# Patient Record
Sex: FEMALE | Race: WHITE | NOT HISPANIC OR LATINO | Employment: FULL TIME | URBAN - METROPOLITAN AREA
[De-identification: names, ages, dates, MRNs, and addresses within clinical notes are randomized per-mention and may not be internally consistent; named-entity substitution may affect disease eponyms.]

---

## 2018-02-27 ENCOUNTER — OFFICE VISIT CONVERTED (OUTPATIENT)
Dept: GASTROENTEROLOGY | Facility: CLINIC | Age: 28
End: 2018-02-27
Attending: INTERNAL MEDICINE

## 2018-04-24 ENCOUNTER — OFFICE VISIT CONVERTED (OUTPATIENT)
Dept: GASTROENTEROLOGY | Facility: CLINIC | Age: 28
End: 2018-04-24
Attending: INTERNAL MEDICINE

## 2019-02-25 ENCOUNTER — HOSPITAL ENCOUNTER (OUTPATIENT)
Dept: OTHER | Facility: HOSPITAL | Age: 29
Discharge: HOME | End: 2019-02-25

## 2019-02-25 LAB
ALBUMIN SERPL-MCNC: 4.4 G/DL (ref 3.5–5)
ALBUMIN/GLOB SERPL: 1.8 {RATIO} (ref 1.4–2.6)
ALP SERPL-CCNC: 47 U/L (ref 42–98)
ALT SERPL-CCNC: 10 U/L (ref 10–40)
ANION GAP SERPL CALC-SCNC: 14 MMOL/L (ref 8–19)
AST SERPL-CCNC: 17 U/L (ref 15–50)
BASOPHILS # BLD AUTO: 0.03 10*3/UL (ref 0–0.2)
BASOPHILS NFR BLD AUTO: 0.4 % (ref 0–3)
BILIRUB SERPL-MCNC: 0.54 MG/DL (ref 0.2–1.3)
BUN SERPL-MCNC: 11 MG/DL (ref 5–25)
BUN/CREAT SERPL: 16 {RATIO} (ref 6–20)
CALCIUM SERPL-MCNC: 9.2 MG/DL (ref 8.7–10.4)
CHLORIDE SERPL-SCNC: 105 MMOL/L (ref 99–111)
CHOLEST SERPL-MCNC: 172 MG/DL (ref 107–200)
CHOLEST/HDLC SERPL: 2.3 {RATIO} (ref 3–6)
CONV ABS IMM GRAN: 0.01 10*3/UL (ref 0–0.2)
CONV CO2: 26 MMOL/L (ref 22–32)
CONV IMMATURE GRAN: 0.1 % (ref 0–1.8)
CONV TOTAL PROTEIN: 6.9 G/DL (ref 6.3–8.2)
CREAT UR-MCNC: 0.7 MG/DL (ref 0.5–0.9)
DEPRECATED RDW RBC AUTO: 38.1 FL (ref 36.4–46.3)
EOSINOPHIL # BLD AUTO: 0.01 10*3/UL (ref 0–0.7)
EOSINOPHIL # BLD AUTO: 0.1 % (ref 0–7)
ERYTHROCYTE [DISTWIDTH] IN BLOOD BY AUTOMATED COUNT: 11.8 % (ref 11.7–14.4)
GFR SERPLBLD BASED ON 1.73 SQ M-ARVRAT: >60 ML/MIN/{1.73_M2}
GLOBULIN UR ELPH-MCNC: 2.5 G/DL (ref 2–3.5)
GLUCOSE SERPL-MCNC: 89 MG/DL (ref 65–99)
HBA1C MFR BLD: 13.8 G/DL (ref 12–16)
HCT VFR BLD AUTO: 41.3 % (ref 37–47)
HDLC SERPL-MCNC: 74 MG/DL (ref 40–60)
LDLC SERPL CALC-MCNC: 90 MG/DL (ref 70–100)
LYMPHOCYTES # BLD AUTO: 1.73 10*3/UL (ref 1–5)
MCH RBC QN AUTO: 29.9 PG (ref 27–31)
MCHC RBC AUTO-ENTMCNC: 33.4 G/DL (ref 33–37)
MCV RBC AUTO: 89.4 FL (ref 81–99)
MONOCYTES # BLD AUTO: 0.4 10*3/UL (ref 0.2–1.2)
MONOCYTES NFR BLD AUTO: 5.2 % (ref 3–10)
NEUTROPHILS # BLD AUTO: 5.53 10*3/UL (ref 2–8)
NEUTROPHILS NFR BLD AUTO: 71.8 % (ref 30–85)
NRBC CBCN: 0 % (ref 0–0.7)
OSMOLALITY SERPL CALC.SUM OF ELEC: 293 MOSM/KG (ref 273–304)
PLATELET # BLD AUTO: 201 10*3/UL (ref 130–400)
PMV BLD AUTO: 9.3 FL (ref 9.4–12.3)
POTASSIUM SERPL-SCNC: 3.4 MMOL/L (ref 3.5–5.3)
RBC # BLD AUTO: 4.62 10*6/UL (ref 4.2–5.4)
SODIUM SERPL-SCNC: 142 MMOL/L (ref 135–147)
TRIGL SERPL-MCNC: 41 MG/DL (ref 40–150)
TSH SERPL-ACNC: 1.27 M[IU]/L (ref 0.27–4.2)
VARIANT LYMPHS NFR BLD MANUAL: 22.4 % (ref 20–45)
VLDLC SERPL-MCNC: 8 MG/DL (ref 5–37)
WBC # BLD AUTO: 7.71 10*3/UL (ref 4.8–10.8)

## 2019-04-26 ENCOUNTER — HOSPITAL ENCOUNTER (OUTPATIENT)
Dept: LAB | Facility: HOSPITAL | Age: 29
Discharge: HOME | End: 2019-04-26

## 2020-02-03 ENCOUNTER — HOSPITAL ENCOUNTER (OUTPATIENT)
Dept: OTHER | Facility: HOSPITAL | Age: 30
Discharge: HOME | End: 2020-02-03

## 2020-02-03 LAB
ALBUMIN SERPL-MCNC: 4.4 G/DL (ref 3.5–5)
ALBUMIN/GLOB SERPL: 1.5 {RATIO} (ref 1.4–2.6)
ALP SERPL-CCNC: 51 U/L (ref 42–98)
ALT SERPL-CCNC: 16 U/L (ref 10–40)
ANION GAP SERPL CALC-SCNC: 16 MMOL/L (ref 8–19)
AST SERPL-CCNC: 16 U/L (ref 15–50)
BASOPHILS # BLD AUTO: 0.04 10*3/UL (ref 0–0.2)
BASOPHILS NFR BLD AUTO: 0.6 % (ref 0–3)
BILIRUB SERPL-MCNC: 0.47 MG/DL (ref 0.2–1.3)
BUN SERPL-MCNC: 17 MG/DL (ref 5–25)
BUN/CREAT SERPL: 22 {RATIO} (ref 6–20)
CALCIUM SERPL-MCNC: 9.7 MG/DL (ref 8.7–10.4)
CHLORIDE SERPL-SCNC: 103 MMOL/L (ref 99–111)
CHOLEST SERPL-MCNC: 159 MG/DL (ref 107–200)
CHOLEST/HDLC SERPL: 2.4 {RATIO} (ref 3–6)
CONV ABS IMM GRAN: 0.01 10*3/UL (ref 0–0.2)
CONV CO2: 22 MMOL/L (ref 22–32)
CONV IMMATURE GRAN: 0.1 % (ref 0–1.8)
CONV TOTAL PROTEIN: 7.3 G/DL (ref 6.3–8.2)
CREAT UR-MCNC: 0.78 MG/DL (ref 0.5–0.9)
DEPRECATED RDW RBC AUTO: 39.3 FL (ref 36.4–46.3)
EOSINOPHIL # BLD AUTO: 0.12 10*3/UL (ref 0–0.7)
EOSINOPHIL # BLD AUTO: 1.7 % (ref 0–7)
ERYTHROCYTE [DISTWIDTH] IN BLOOD BY AUTOMATED COUNT: 11.9 % (ref 11.7–14.4)
GFR SERPLBLD BASED ON 1.73 SQ M-ARVRAT: >60 ML/MIN/{1.73_M2}
GLOBULIN UR ELPH-MCNC: 2.9 G/DL (ref 2–3.5)
GLUCOSE SERPL-MCNC: 96 MG/DL (ref 65–99)
HCT VFR BLD AUTO: 42 % (ref 37–47)
HDLC SERPL-MCNC: 67 MG/DL (ref 40–60)
HGB BLD-MCNC: 13.7 G/DL (ref 12–16)
LDLC SERPL CALC-MCNC: 82 MG/DL (ref 70–100)
LYMPHOCYTES # BLD AUTO: 1.69 10*3/UL (ref 1–5)
LYMPHOCYTES NFR BLD AUTO: 24.6 % (ref 20–45)
MCH RBC QN AUTO: 29.5 PG (ref 27–31)
MCHC RBC AUTO-ENTMCNC: 32.6 G/DL (ref 33–37)
MCV RBC AUTO: 90.3 FL (ref 81–99)
MONOCYTES # BLD AUTO: 0.47 10*3/UL (ref 0.2–1.2)
MONOCYTES NFR BLD AUTO: 6.9 % (ref 3–10)
NEUTROPHILS # BLD AUTO: 4.53 10*3/UL (ref 2–8)
NEUTROPHILS NFR BLD AUTO: 66.1 % (ref 30–85)
NRBC CBCN: 0 % (ref 0–0.7)
OSMOLALITY SERPL CALC.SUM OF ELEC: 285 MOSM/KG (ref 273–304)
PLATELET # BLD AUTO: 245 10*3/UL (ref 130–400)
PMV BLD AUTO: 9.2 FL (ref 9.4–12.3)
POTASSIUM SERPL-SCNC: 4 MMOL/L (ref 3.5–5.3)
RBC # BLD AUTO: 4.65 10*6/UL (ref 4.2–5.4)
SODIUM SERPL-SCNC: 137 MMOL/L (ref 135–147)
TRIGL SERPL-MCNC: 49 MG/DL (ref 40–150)
TSH SERPL-ACNC: 3.05 M[IU]/L (ref 0.27–4.2)
VLDLC SERPL-MCNC: 10 MG/DL (ref 5–37)
WBC # BLD AUTO: 6.86 10*3/UL (ref 4.8–10.8)

## 2020-04-20 ENCOUNTER — HOSPITAL ENCOUNTER (OUTPATIENT)
Dept: OTHER | Facility: HOSPITAL | Age: 30
Discharge: HOME | End: 2020-04-20
Attending: INTERNAL MEDICINE

## 2020-04-20 LAB
APPEARANCE UR: ABNORMAL
BILIRUB UR QL: ABNORMAL
COLOR UR: ABNORMAL
CONV BACTERIA: NEGATIVE
CONV COLLECTION SOURCE (UA): ABNORMAL
CONV UROBILINOGEN IN URINE BY AUTOMATED TEST STRIP: 1 {EHRLICHU}/DL (ref 0.1–1)
GLUCOSE UR QL: NEGATIVE MG/DL
HGB UR QL STRIP: NEGATIVE
KETONES UR QL STRIP: ABNORMAL MG/DL
LEUKOCYTE ESTERASE UR QL STRIP: ABNORMAL
NITRITE UR QL STRIP: NEGATIVE
PH UR STRIP.AUTO: 5 [PH] (ref 5–8)
PROT UR QL: 30 MG/DL
RBC #/AREA URNS HPF: ABNORMAL /[HPF]
SP GR UR: 1.03 (ref 1–1.03)
SQUAMOUS SPT QL MICRO: ABNORMAL /[HPF]
WBC #/AREA URNS HPF: ABNORMAL /[HPF]

## 2021-05-16 VITALS
DIASTOLIC BLOOD PRESSURE: 75 MMHG | SYSTOLIC BLOOD PRESSURE: 123 MMHG | WEIGHT: 148.12 LBS | HEIGHT: 68 IN | BODY MASS INDEX: 22.45 KG/M2

## 2021-05-16 VITALS
BODY MASS INDEX: 21.82 KG/M2 | HEIGHT: 68 IN | WEIGHT: 144 LBS | SYSTOLIC BLOOD PRESSURE: 123 MMHG | DIASTOLIC BLOOD PRESSURE: 78 MMHG

## 2021-07-04 ENCOUNTER — HOSPITAL ENCOUNTER (EMERGENCY)
Facility: HOSPITAL | Age: 31
Discharge: HOME | End: 2021-07-04
Attending: EMERGENCY MEDICINE | Admitting: EMERGENCY MEDICINE

## 2021-07-04 VITALS
BODY MASS INDEX: 23.98 KG/M2 | RESPIRATION RATE: 16 BRPM | HEIGHT: 67 IN | SYSTOLIC BLOOD PRESSURE: 121 MMHG | DIASTOLIC BLOOD PRESSURE: 77 MMHG | TEMPERATURE: 97.7 F | WEIGHT: 152.78 LBS | HEART RATE: 72 BPM | OXYGEN SATURATION: 100 %

## 2021-07-04 DIAGNOSIS — S05.01XA ABRASION OF RIGHT CORNEA, INITIAL ENCOUNTER: Primary | ICD-10-CM

## 2021-07-04 PROCEDURE — 99283 EMERGENCY DEPT VISIT LOW MDM: CPT

## 2021-07-04 RX ORDER — PROPARACAINE HYDROCHLORIDE 5 MG/ML
1 SOLUTION/ DROPS OPHTHALMIC ONCE
Status: COMPLETED | OUTPATIENT
Start: 2021-07-04 | End: 2021-07-04

## 2021-07-04 RX ADMIN — PROPARACAINE HYDROCHLORIDE 1 DROP: 5 SOLUTION/ DROPS OPHTHALMIC at 07:24

## 2021-07-04 NOTE — ED PROVIDER NOTES
Subjective     History provided by:  Patient   used: No    Eye Pain  Location:  Right eye  Quality:  Burning  Severity:  Mild  Onset quality:  Gradual  Duration:  1 day  Timing:  Constant  Progression:  Worsening  Chronicity:  New  Associated symptoms: no congestion, no cough, no diarrhea, no fatigue, no fever, no headaches, no rhinorrhea and no shortness of breath    Risk factors:  Had a friend at work prescribe her ofloxacin to cover her eye for any infection      Review of Systems   Constitutional: Negative for appetite change, chills, fatigue and fever.   HENT: Negative.  Negative for congestion and rhinorrhea.    Eyes: Positive for pain. Negative for photophobia.   Respiratory: Negative.  Negative for cough and shortness of breath.    Gastrointestinal: Negative.  Negative for diarrhea.   Endocrine: Negative.    Genitourinary: Negative.    Musculoskeletal: Negative.    Skin: Negative.    Allergic/Immunologic: Negative.  Negative for immunocompromised state.   Neurological: Negative.  Negative for headaches.   Hematological: Negative.    Psychiatric/Behavioral: Negative.    All other systems reviewed and are negative.      History reviewed. No pertinent past medical history.    No Known Allergies    History reviewed. No pertinent surgical history.    History reviewed. No pertinent family history.    Social History     Socioeconomic History   • Marital status:      Spouse name: Not on file   • Number of children: Not on file   • Years of education: Not on file   • Highest education level: Not on file   Tobacco Use   • Smoking status: Never Smoker   • Smokeless tobacco: Never Used   Substance and Sexual Activity   • Alcohol use: Never   • Drug use: Never           Objective   Physical Exam  Vitals and nursing note reviewed.   Constitutional:       General: She is not in acute distress.     Appearance: Normal appearance. She is not toxic-appearing.   HENT:      Head: Normocephalic and  atraumatic.      Mouth/Throat:      Mouth: Mucous membranes are moist.   Eyes:      General: Lids are normal. Lids are everted, no foreign bodies appreciated. Vision grossly intact. Gaze aligned appropriately. Scleral icterus (right side) present.         Right eye: No foreign body.      Extraocular Movements: Extraocular movements intact.      Right eye: Normal extraocular motion and no nystagmus.      Conjunctiva/sclera:      Right eye: Right conjunctiva is injected. No chemosis, exudate or hemorrhage.     Pupils: Pupils are equal, round, and reactive to light.     Cardiovascular:      Rate and Rhythm: Normal rate and regular rhythm.      Pulses: Normal pulses.      Heart sounds: Normal heart sounds.   Pulmonary:      Effort: Pulmonary effort is normal. No respiratory distress.      Breath sounds: Normal breath sounds.   Abdominal:      General: Abdomen is flat.      Palpations: Abdomen is soft.      Tenderness: There is no abdominal tenderness.   Musculoskeletal:         General: Normal range of motion.      Cervical back: Normal range of motion and neck supple.   Skin:     General: Skin is warm and dry.   Neurological:      Mental Status: She is alert and oriented to person, place, and time. Mental status is at baseline.         Procedures           ED Course                                           MDM  Number of Diagnoses or Management Options  Risk of Complications, Morbidity, and/or Mortality  Presenting problems: minimal    Patient Progress  Patient progress: improved      Final diagnoses:   Abrasion of right cornea, initial encounter       ED Disposition  ED Disposition     ED Disposition Condition Comment    Discharge Stable           Lawson Dominguez MD  [address] [place] [address]  [phone]    Call in 1 day           Medication List      No changes were made to your prescriptions during this visit.          Luis Carlos Haywood PA  07/04/21 0879

## 2022-01-26 ENCOUNTER — OFFICE VISIT (OUTPATIENT)
Dept: OBSTETRICS AND GYNECOLOGY | Facility: CLINIC | Age: 32
End: 2022-01-26

## 2022-01-26 VITALS
HEIGHT: 69 IN | WEIGHT: 145 LBS | SYSTOLIC BLOOD PRESSURE: 122 MMHG | BODY MASS INDEX: 21.48 KG/M2 | DIASTOLIC BLOOD PRESSURE: 76 MMHG | HEART RATE: 80 BPM

## 2022-01-26 DIAGNOSIS — Z01.419 WELL WOMAN EXAM WITH ROUTINE GYNECOLOGICAL EXAM: Primary | ICD-10-CM

## 2022-01-26 PROCEDURE — 99385 PREV VISIT NEW AGE 18-39: CPT | Performed by: OBSTETRICS & GYNECOLOGY

## 2022-01-26 PROCEDURE — G0123 SCREEN CERV/VAG THIN LAYER: HCPCS | Performed by: OBSTETRICS & GYNECOLOGY

## 2022-01-26 NOTE — PROGRESS NOTES
"Well Woman Visit    CC: WWE     [facility] intake: No  No family hx   Tobacco/Nicotine use:  No    HPI:   31 y.o. Contraception or HRT: Vasectomy   Complaint: Some left lower quadrant pain, midcycle  Mirena has been removed.  Birth control is vasectomy    History: PMHx, Meds, Allergies, PSHx, Social Hx, and POBHx all reviewed and updated.  PCP: does manage PMHx and preventative labs      /76   Pulse 80   Ht 174 cm (68.5\")   Wt 65.8 kg (145 lb)   LMP 2021   BMI 21.73 kg/m²     Physical Exam Physical Exam  Vitals and nursing note reviewed. Exam conducted with a chaperone present.   Constitutional:       Appearance: Normal appearance.   Neck:      Thyroid: No thyroid mass or thyromegaly.   Cardiovascular:      Rate and Rhythm: Regular rhythm.   Pulmonary:      Effort: Pulmonary effort is normal.      Unlabored  Chest:      Breasts: Large amount of fibroglandular breast densities        Right: No mass, nipple discharge or tenderness.         Left: No mass, nipple discharge or tenderness.   Abdominal:      General: There is no distension.      Palpations: Abdomen is soft.      Tenderness: There is no guarding or rebound.   Genitourinary:     General: Normal vulva.      Labia:   No lesions     Urethra: No urethral pain or urethral lesion.      Vagina: Normal. No vaginal discharge, tenderness or prolapsed vaginal walls.      Cervix: Normal.      Uterus: Normal.       Adnexa: Right adnexa normal and left adnexa normal.  Mild tenderness  Musculoskeletal:      Cervical back: Neck supple. No tenderness.   Skin:     General: Skin is warm and dry.   Neurological:      Mental Status: She is alert and oriented to person, place, and time.   Psychiatric:         Mood and Affect: Mood normal.         Behavior: Behavior normal.         Thought Content: Thought content normal.       ASSESSMENT AND PLAN:  WWE    Diagnoses and all orders for this visit:    1. Well woman exam with routine gynecological exam " (Primary)  -     IGP,rfx Aptima HPV All Pth        Counseling:     Track menses, RTO IF <q21d, >7d long, or heavy    Preventative:   MMG at age 42  s/p COVID vaccine    Follow Up:  No follow-ups on file.    Luis Alberto Roque Sr., MD  01/26/2022

## 2022-02-01 LAB
CONV .: NORMAL
CYTOLOGIST CVX/VAG CYTO: NORMAL
CYTOLOGY CVX/VAG DOC CYTO: NORMAL
CYTOLOGY CVX/VAG DOC THIN PREP: NORMAL
DX ICD CODE: NORMAL
HIV 1 & 2 AB SER-IMP: NORMAL
OTHER STN SPEC: NORMAL
STAT OF ADQ CVX/VAG CYTO-IMP: NORMAL

## 2022-03-18 ENCOUNTER — OFFICE VISIT (OUTPATIENT)
Dept: FAMILY MEDICINE CLINIC | Facility: CLINIC | Age: 32
End: 2022-03-18

## 2022-03-18 DIAGNOSIS — Z00.00 ENCOUNTER FOR GENERAL ADULT MEDICAL EXAMINATION WITHOUT ABNORMAL FINDINGS: Primary | ICD-10-CM

## 2022-03-18 PROBLEM — N63.0 BREAST MASS: Status: ACTIVE | Noted: 2022-03-18

## 2022-03-18 PROCEDURE — 99202 OFFICE O/P NEW SF 15 MIN: CPT

## 2022-03-18 NOTE — PROGRESS NOTES
Chief Complaint   Patient presents with   • Immunizations     Immunization intolerance.       Subjective          Yady Rogers presents to [facility]    Pt is being seen today for an adult medical examination to establish care with a new provider. 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 She was diagnosed with symptoms characteristic of GBS and instructed to take no further influenza vaccination and no new vaccinations that she had not previously received. She got her Tdap shot in 2020, as it is one she has received before but since 2013 has received no other vaccinations.     She is  and lives in [place], [place]. She has no biological children. She is a Nurse practitioner with Nephrology. She has never used tobacco and does not drink alcohol or use drugs.     Her mother has a history of mental health issues and drug abuse.  Her maternal grandmother has a history of GBS.      Past History:  Medical History: has no past medical history on file.   Surgical History: has a past surgical history that includes Breast surgery and Tonsillectomy.   Family History: family history includes Cervical cancer in her mother.   Social History: reports that she has never smoked. She has never used smokeless tobacco. She reports that she does not drink alcohol and does not use drugs.  Allergies: Fluarix [influenza virus vaccine]  (Not in a hospital admission)       Social History     Socioeconomic History   • Marital status:    Tobacco Use   • Smoking status: Never Smoker   • Smokeless tobacco: Never Used   Substance and Sexual Activity   • Alcohol use: Never   • Drug use: Never   • Sexual activity: Defer     Birth control/protection: None       Health Maintenance Due   Topic Date Due   • ANNUAL PHYSICAL  Never done   • COVID-19 Vaccine (1) Never done   • HEPATITIS C SCREENING   Never done       Objective     Vital Signs:   There were no vitals taken for this visit.      Physical Exam  Constitutional:       Appearance: Normal appearance.   Pulmonary:      Effort: Pulmonary effort is normal.   Neurological:      General: No focal deficit present.      Mental Status: She is alert and oriented to person, place, and time.   Psychiatric:         Mood and Affect: Mood normal.         Behavior: Behavior normal.          Review of Systems   All other systems reviewed and are negative.       Result Review :                 Assessment and Plan    Diagnoses and all orders for this visit:    1. Encounter for general adult medical examination without abnormal findings (Primary)           Pt thought to be clinically stable at this time.    Follow Up   Return in about 1 year (around 3/18/2023), or if symptoms worsen or fail to improve.  Patient was given instructions and counseling regarding her condition or for health maintenance advice. Please see specific information pulled into the AVS if appropriate.

## 2022-04-04 DIAGNOSIS — B00.1 FEVER BLISTER: Primary | ICD-10-CM

## 2022-04-04 RX ORDER — VALACYCLOVIR HYDROCHLORIDE 500 MG/1
500 TABLET, FILM COATED ORAL DAILY
Qty: 30 TABLET | Refills: 0 | Status: SHIPPED | OUTPATIENT
Start: 2022-04-04 | End: 2022-05-04

## 2022-04-06 DIAGNOSIS — F43.0 ANXIETY IN ACUTE STRESS REACTION: Primary | ICD-10-CM

## 2022-04-06 DIAGNOSIS — F41.1 ANXIETY IN ACUTE STRESS REACTION: Primary | ICD-10-CM

## 2022-04-06 RX ORDER — ESCITALOPRAM OXALATE 10 MG/1
10 TABLET ORAL DAILY
Qty: 30 TABLET | Refills: 2 | Status: SHIPPED | OUTPATIENT
Start: 2022-04-06 | End: 2022-10-24

## 2022-10-24 ENCOUNTER — OFFICE VISIT (OUTPATIENT)
Dept: FAMILY MEDICINE CLINIC | Facility: CLINIC | Age: 32
End: 2022-10-24

## 2022-10-24 VITALS
BODY MASS INDEX: 22.38 KG/M2 | OXYGEN SATURATION: 99 % | TEMPERATURE: 98.4 F | HEART RATE: 88 BPM | HEIGHT: 69 IN | DIASTOLIC BLOOD PRESSURE: 60 MMHG | WEIGHT: 151.1 LBS | SYSTOLIC BLOOD PRESSURE: 104 MMHG

## 2022-10-24 DIAGNOSIS — J02.9 SORE THROAT: Primary | ICD-10-CM

## 2022-10-24 DIAGNOSIS — R52 BODY ACHES: ICD-10-CM

## 2022-10-24 DIAGNOSIS — R50.9 FEVER, UNSPECIFIED FEVER CAUSE: ICD-10-CM

## 2022-10-24 LAB
EXPIRATION DATE: NORMAL
FLUAV AG NPH QL: NEGATIVE
FLUBV AG NPH QL: NEGATIVE
INTERNAL CONTROL: NORMAL
Lab: NORMAL
S PYO AG THROAT QL: NEGATIVE
SARS-COV-2 AG UPPER RESP QL IA.RAPID: NOT DETECTED

## 2022-10-24 PROCEDURE — 87880 STREP A ASSAY W/OPTIC: CPT

## 2022-10-24 PROCEDURE — 87426 SARSCOV CORONAVIRUS AG IA: CPT

## 2022-10-24 PROCEDURE — 99213 OFFICE O/P EST LOW 20 MIN: CPT

## 2022-10-24 PROCEDURE — 87804 INFLUENZA ASSAY W/OPTIC: CPT

## 2022-10-24 NOTE — PROGRESS NOTES
"Chief Complaint   Patient presents with   • Fever     Started feeling bad yesterday, took a home Covid test last night and was negative.    • Sore Throat   • Generalized Body Aches       Lance Rogers presents to [facility]    History of Present Illness  She is here to be seen for a fever that started yesterday, sore throat, and body aches. She states her fever was up to 102 yesterday. She was tested for COVID, Flu, and Strep today and was negative for all three. She will continue taking ibuprofen or tylenol as needed for fever and body aches. Aside from the acute illness, she is otherwise overall very healthy.       Past History:  Medical History: has no past medical history on file.   Surgical History: has a past surgical history that includes Breast surgery and Tonsillectomy.   Family History: family history includes Cervical cancer in her mother.   Social History: reports that she has never smoked. She has never used smokeless tobacco. She reports that she does not drink alcohol and does not use drugs.  Allergies: Fluarix [influenza virus vaccine]  (Not in a hospital admission)       Social History     Socioeconomic History   • Marital status:    Tobacco Use   • Smoking status: Never   • Smokeless tobacco: Never   Substance and Sexual Activity   • Alcohol use: Never   • Drug use: Never   • Sexual activity: Defer     Birth control/protection: None       Health Maintenance Due   Topic Date Due   • HEPATITIS C SCREENING  Never done       Objective     Vital Signs:   /60 (BP Location: Left arm, Patient Position: Sitting)   Pulse 88   Temp 98.4 °F (36.9 °C) (Infrared)   Ht 174 cm (68.5\")   Wt 68.5 kg (151 lb 1.6 oz)   SpO2 99%   BMI 22.64 kg/m²       Physical Exam  Constitutional:       Appearance: Normal appearance. She is ill-appearing.   Cardiovascular:      Rate and Rhythm: Normal rate and regular rhythm.      Pulses: Normal pulses.      " Heart sounds: Normal heart sounds.   Pulmonary:      Effort: Pulmonary effort is normal.   Musculoskeletal:         General: Normal range of motion.   Neurological:      General: No focal deficit present.      Mental Status: She is alert and oriented to person, place, and time.   Psychiatric:         Mood and Affect: Mood normal.         Behavior: Behavior normal.          Review of Systems   Constitutional: Positive for fatigue and fever.   HENT: Positive for sore throat.    Musculoskeletal: Positive for myalgias.   All other systems reviewed and are negative.       Result Review :                 Assessment and Plan    Diagnoses and all orders for this visit:    1. Sore throat (Primary)  -     POCT rapid strep A    2. Fever, unspecified fever cause  -     POCT SARS-CoV-2 Antigen YEVGENIY  -     POCT Influenza A/B    3. Body aches  -     POCT SARS-CoV-2 Antigen YEVGENIY  -     POCT Influenza A/B       I spent 20 minutes caring for Yady on this date of service. This time includes time spent by me in the following activities:preparing for the visit, reviewing tests, performing a medically appropriate examination and/or evaluation , counseling and educating the patient/family/caregiver, ordering medications, tests, or procedures and documenting information in the medical record    Pt thought to be clinically stable at this time.    Follow Up   No follow-ups on file.  Patient was given instructions and counseling regarding her condition or for health maintenance advice. Please see specific information pulled into the AVS if appropriate.

## 2022-11-09 DIAGNOSIS — M25.562 ACUTE PAIN OF LEFT KNEE: ICD-10-CM

## 2022-11-09 DIAGNOSIS — M23.52 RECURRENT LEFT KNEE INSTABILITY: Primary | ICD-10-CM

## 2022-12-09 ENCOUNTER — TELEPHONE (OUTPATIENT)
Dept: FAMILY MEDICINE CLINIC | Facility: CLINIC | Age: 32
End: 2022-12-09

## 2022-12-09 DIAGNOSIS — M23.52 RECURRENT LEFT KNEE INSTABILITY: Primary | ICD-10-CM

## 2022-12-09 DIAGNOSIS — M25.562 ACUTE PAIN OF LEFT KNEE: ICD-10-CM

## 2022-12-09 NOTE — TELEPHONE ENCOUNTER
Abrahan from financial clearance called and the MRI of the knee was going to be denied through the patient insurance due to there not being a XR on file and they were going to want six weeks of physical therapy to approve the MRI is this something that you can put in? Pended both orders to you.

## 2022-12-14 ENCOUNTER — APPOINTMENT (OUTPATIENT)
Dept: MRI IMAGING | Facility: HOSPITAL | Age: 32
End: 2022-12-14

## 2022-12-15 ENCOUNTER — HOSPITAL ENCOUNTER (OUTPATIENT)
Dept: GENERAL RADIOLOGY | Facility: HOSPITAL | Age: 32
Discharge: HOME | End: 2022-12-15

## 2022-12-15 ENCOUNTER — HOSPITAL ENCOUNTER (OUTPATIENT)
Dept: MRI IMAGING | Facility: HOSPITAL | Age: 32
Discharge: HOME | End: 2022-12-15

## 2022-12-15 DIAGNOSIS — M25.362 KNEE INSTABILITY, LEFT: ICD-10-CM

## 2022-12-15 DIAGNOSIS — M25.562 PAIN AND SWELLING OF LEFT KNEE: ICD-10-CM

## 2022-12-15 DIAGNOSIS — M23.52 RECURRENT LEFT KNEE INSTABILITY: ICD-10-CM

## 2022-12-15 DIAGNOSIS — M25.462 PAIN AND SWELLING OF LEFT KNEE: ICD-10-CM

## 2022-12-15 DIAGNOSIS — M25.362 KNEE INSTABILITY, LEFT: Primary | ICD-10-CM

## 2022-12-15 DIAGNOSIS — M25.562 ACUTE PAIN OF LEFT KNEE: ICD-10-CM

## 2022-12-15 PROCEDURE — 73721 MRI JNT OF LWR EXTRE W/O DYE: CPT

## 2022-12-15 PROCEDURE — 73560 X-RAY EXAM OF KNEE 1 OR 2: CPT

## 2022-12-21 RX ORDER — VALACYCLOVIR HYDROCHLORIDE 500 MG/1
500 TABLET, FILM COATED ORAL DAILY
Qty: 30 TABLET | Refills: 3 | Status: SHIPPED | OUTPATIENT
Start: 2022-12-21 | End: 2023-01-20

## 2022-12-29 DIAGNOSIS — T14.8XXA CARTILAGE TEAR: Primary | ICD-10-CM

## 2023-01-13 ENCOUNTER — OFFICE VISIT (OUTPATIENT)
Dept: ORTHOPEDIC SURGERY | Facility: CLINIC | Age: 33
End: 2023-01-13
Payer: COMMERCIAL

## 2023-01-13 VITALS — HEIGHT: 68 IN | WEIGHT: 150 LBS | BODY MASS INDEX: 22.73 KG/M2

## 2023-01-13 DIAGNOSIS — M22.2X2 PATELLOFEMORAL PAIN SYNDROME OF LEFT KNEE: Primary | ICD-10-CM

## 2023-01-13 PROCEDURE — 99203 OFFICE O/P NEW LOW 30 MIN: CPT | Performed by: ORTHOPAEDIC SURGERY

## 2023-01-13 NOTE — PROGRESS NOTES
"Chief Complaint  Initial Evaluation of the Left Knee     Subjective      Yady Rogers presents to [facility] for initial evaluation of the left knee. She was exercising and felt increase lateral knee pain. She notes it a lot with squats and hiking.  She wants to run but is having increase pain with running also. She has had no injury.    Allergies   Allergen Reactions   • Fluarix [Influenza Virus Vaccine] Unknown - High Severity        Social History     Socioeconomic History   • Marital status:    Tobacco Use   • Smoking status: Never   • Smokeless tobacco: Never   Substance and Sexual Activity   • Alcohol use: Never   • Drug use: Never   • Sexual activity: Defer     Birth control/protection: None        Review of Systems     Objective   Vital Signs:   Ht 172.7 cm (68\")   Wt 68 kg (150 lb)   BMI 22.81 kg/m²       Physical Exam  Constitutional:       Appearance: Normal appearance. Patient is well-developed and normal weight.   HENT:      Head: Normocephalic.      Right Ear: Hearing and external ear normal.      Left Ear: Hearing and external ear normal.      Nose: Nose normal.   Eyes:      Conjunctiva/sclera: Conjunctivae normal.   Cardiovascular:      Rate and Rhythm: Normal rate.   Pulmonary:      Effort: Pulmonary effort is normal.      Breath sounds: No wheezing or rales.   Abdominal:      Palpations: Abdomen is soft.      Tenderness: There is no abdominal tenderness.   Musculoskeletal:      Cervical back: Normal range of motion.   Skin:     Findings: No rash.   Neurological:      Mental Status: Patient  is alert and oriented to person, place, and time.   Psychiatric:         Mood and Affect: Mood and affect normal.         Judgment: Judgment normal.       Ortho Exam      LEFT KNEE Dorsal pedal pulse 2+. Posterior tibialis pulse is 2+. Good tone of hip flexors, hip extensors, and hip abductors. Calf supple Sensation intact. Neurovascular Intact. Full ROM.  Pain on the " lateral joint line. Negative Apley's and Marilyn's. Minimal swelling. No skin discoloration or muscle atrophy.       Procedures        Imaging Results (Most Recent)     None           Result Review :         XR Knee 1 or 2 View Left    Result Date: 12/15/2022  Narrative: PROCEDURE: XR KNEE 1 OR 2 VW LEFT  COMPARISON: [facility], CR, KNEE 3 VIEWS LT, 7/21/2008, 16:37.  INDICATIONS: LEFT KNEE PAIN WITH SWELLING AFTER INJURY  FINDINGS:  BONES: Normal.  No significant arthropathy or acute abnormality.  SOFT TISSUES: Negative.  No visible soft tissue swelling.  EFFUSION: None visible.  OTHER: Negative.       Impression:  No acute fracture or traumatic malalignment identified.      RICHAR RYDER MD       Electronically Signed and Approved By: RICHAR RYDER MD on 12/15/2022 at 9:36             MRI Knee Left Without Contrast    Result Date: 12/16/2022  Narrative: PROCEDURE: MRI KNEE LEFT  WO CONTRAST  COMPARISON: None  INDICATIONS: Knee instability      TECHNIQUE: A complete multi-planar MRI was performed.   FINDINGS:  The cruciate ligaments, collateral ligaments, and extensor mechanism of the knee are intact. There is no evidence for medial or lateral meniscal tear.  No significant joint effusion is observed.  No significant Baker's cyst identified.  A near full-thickness fissures seen along the medial facet of the patellar cartilage (series 6 image 24).  Mild diffuse cartilage loss is present within the medial compartment with overlying near full-thickness fissure present within the medial femoral cartilage with underlying osseous edema (series 8, image 18).  The medial tibial cartilage appears unremarkable.. There is no evidence for osteochondral injury. No displaced osteochondral fragments are seen.  There is no evidence of patellar subluxation.  The trochlear groove appears normal in configuration.  The retinaculum appears intact.  No significant focal abnormal bone marrow signal is identified. The  cortical margins are intact. No significant abnormal focal fluid collection is observed within the surrounding soft tissues.      Impression:   1. Near full-thickness fissure along the medial facet of the patellar cartilage as well as overlying the medial femoral condyle.  Underlying osseous edema is present within the medial femoral condyle.  No additional significant cartilage defects identified.  2. Otherwise, no evidence of internal derangement or significant degenerative change.     ARNEL ACE MD       Electronically Signed and Approved By: ARNEL ACE MD on 12/16/2022 at 16:03                      Assessment and Plan     Diagnoses and all orders for this visit:    1. Patellofemoral pain syndrome of left knee (Primary)        Discussed the treatment plan with the patient. She was given an order for physical therapy. Discussed activity modification to prevent further injury and decrease pain.     Call or return if worsening symptoms.    Follow Up     PRN    Patient was given instructions and counseling regarding her condition or for health maintenance advice. Please see specific information pulled into the AVS if appropriate.     Scribed for Ronni Cortez MD by Yoselin Dupree MA.  01/13/23   15:07 EST      I have personally performed the services described in this document as scribed by the above individual and it is both accurate and complete. Ronni Cortez MD 01/13/23

## 2023-03-15 DIAGNOSIS — E55.9 VITAMIN D DEFICIENCY: ICD-10-CM

## 2023-03-15 DIAGNOSIS — R53.83 FATIGUE, UNSPECIFIED TYPE: ICD-10-CM

## 2023-03-15 DIAGNOSIS — Z13.29 SCREENING FOR THYROID DISORDER: Primary | ICD-10-CM

## 2023-03-15 DIAGNOSIS — Z13.220 SCREENING, LIPID: ICD-10-CM

## 2023-03-16 ENCOUNTER — LAB (OUTPATIENT)
Dept: LAB | Facility: HOSPITAL | Age: 33
End: 2023-03-16
Payer: COMMERCIAL

## 2023-03-16 DIAGNOSIS — E55.9 VITAMIN D DEFICIENCY: ICD-10-CM

## 2023-03-16 DIAGNOSIS — Z13.220 SCREENING, LIPID: ICD-10-CM

## 2023-03-16 DIAGNOSIS — Z13.29 SCREENING FOR THYROID DISORDER: ICD-10-CM

## 2023-03-16 DIAGNOSIS — R53.83 FATIGUE, UNSPECIFIED TYPE: Primary | ICD-10-CM

## 2023-03-16 DIAGNOSIS — R53.83 FATIGUE, UNSPECIFIED TYPE: ICD-10-CM

## 2023-03-16 LAB
25(OH)D3 SERPL-MCNC: 31.6 NG/ML (ref 30–100)
ALBUMIN SERPL-MCNC: 4.3 G/DL (ref 3.5–5.2)
ALBUMIN/GLOB SERPL: 1.5 G/DL
ALP SERPL-CCNC: 57 U/L (ref 39–117)
ALT SERPL W P-5'-P-CCNC: 10 U/L (ref 1–33)
ANION GAP SERPL CALCULATED.3IONS-SCNC: 9 MMOL/L (ref 5–15)
AST SERPL-CCNC: 18 U/L (ref 1–32)
BASOPHILS # BLD AUTO: 0.03 10*3/MM3 (ref 0–0.2)
BASOPHILS NFR BLD AUTO: 0.4 % (ref 0–1.5)
BILIRUB SERPL-MCNC: 0.5 MG/DL (ref 0–1.2)
BUN SERPL-MCNC: 14 MG/DL (ref 6–20)
BUN/CREAT SERPL: 19.4 (ref 7–25)
CALCIUM SPEC-SCNC: 9.2 MG/DL (ref 8.6–10.5)
CHLORIDE SERPL-SCNC: 104 MMOL/L (ref 98–107)
CHOLEST SERPL-MCNC: 152 MG/DL (ref 0–200)
CO2 SERPL-SCNC: 24 MMOL/L (ref 22–29)
CREAT SERPL-MCNC: 0.72 MG/DL (ref 0.57–1)
DEPRECATED RDW RBC AUTO: 39.6 FL (ref 37–54)
EGFRCR SERPLBLD CKD-EPI 2021: 114.8 ML/MIN/1.73
EOSINOPHIL # BLD AUTO: 0 10*3/MM3 (ref 0–0.4)
EOSINOPHIL NFR BLD AUTO: 0 % (ref 0.3–6.2)
ERYTHROCYTE [DISTWIDTH] IN BLOOD BY AUTOMATED COUNT: 13 % (ref 12.3–15.4)
FERRITIN SERPL-MCNC: 16.3 NG/ML (ref 13–150)
FOLATE SERPL-MCNC: 16.9 NG/ML (ref 4.78–24.2)
GLOBULIN UR ELPH-MCNC: 2.8 GM/DL
GLUCOSE SERPL-MCNC: 87 MG/DL (ref 65–99)
HCT VFR BLD AUTO: 36.3 % (ref 34–46.6)
HDLC SERPL-MCNC: 61 MG/DL (ref 40–60)
HGB BLD-MCNC: 11.8 G/DL (ref 12–15.9)
IMM GRANULOCYTES # BLD AUTO: 0.01 10*3/MM3 (ref 0–0.05)
IMM GRANULOCYTES NFR BLD AUTO: 0.1 % (ref 0–0.5)
IRON 24H UR-MRATE: 99 MCG/DL (ref 37–145)
IRON SATN MFR SERPL: 18 % (ref 20–50)
LDLC SERPL CALC-MCNC: 83 MG/DL (ref 0–100)
LDLC/HDLC SERPL: 1.38 {RATIO}
LYMPHOCYTES # BLD AUTO: 2.13 10*3/MM3 (ref 0.7–3.1)
LYMPHOCYTES NFR BLD AUTO: 29.7 % (ref 19.6–45.3)
MCH RBC QN AUTO: 27.4 PG (ref 26.6–33)
MCHC RBC AUTO-ENTMCNC: 32.5 G/DL (ref 31.5–35.7)
MCV RBC AUTO: 84.2 FL (ref 79–97)
MONOCYTES # BLD AUTO: 0.5 10*3/MM3 (ref 0.1–0.9)
MONOCYTES NFR BLD AUTO: 7 % (ref 5–12)
NEUTROPHILS NFR BLD AUTO: 4.51 10*3/MM3 (ref 1.7–7)
NEUTROPHILS NFR BLD AUTO: 62.8 % (ref 42.7–76)
NRBC BLD AUTO-RTO: 0 /100 WBC (ref 0–0.2)
PLATELET # BLD AUTO: 277 10*3/MM3 (ref 140–450)
PMV BLD AUTO: 10 FL (ref 6–12)
POTASSIUM SERPL-SCNC: 3.5 MMOL/L (ref 3.5–5.2)
PROT SERPL-MCNC: 7.1 G/DL (ref 6–8.5)
RBC # BLD AUTO: 4.31 10*6/MM3 (ref 3.77–5.28)
SODIUM SERPL-SCNC: 137 MMOL/L (ref 136–145)
T4 FREE SERPL-MCNC: 1.23 NG/DL (ref 0.93–1.7)
TIBC SERPL-MCNC: 545 MCG/DL (ref 298–536)
TRANSFERRIN SERPL-MCNC: 366 MG/DL (ref 200–360)
TRIGL SERPL-MCNC: 35 MG/DL (ref 0–150)
TSH SERPL DL<=0.05 MIU/L-ACNC: 1.65 UIU/ML (ref 0.27–4.2)
VIT B12 BLD-MCNC: 272 PG/ML (ref 211–946)
VLDLC SERPL-MCNC: 8 MG/DL (ref 5–40)
WBC NRBC COR # BLD: 7.18 10*3/MM3 (ref 3.4–10.8)

## 2023-03-16 PROCEDURE — 83540 ASSAY OF IRON: CPT

## 2023-03-16 PROCEDURE — 82607 VITAMIN B-12: CPT

## 2023-03-16 PROCEDURE — 80050 GENERAL HEALTH PANEL: CPT

## 2023-03-16 PROCEDURE — 82728 ASSAY OF FERRITIN: CPT

## 2023-03-16 PROCEDURE — 82306 VITAMIN D 25 HYDROXY: CPT

## 2023-03-16 PROCEDURE — 84466 ASSAY OF TRANSFERRIN: CPT

## 2023-03-16 PROCEDURE — 82746 ASSAY OF FOLIC ACID SERUM: CPT

## 2023-03-16 PROCEDURE — 36415 COLL VENOUS BLD VENIPUNCTURE: CPT

## 2023-03-16 PROCEDURE — 84439 ASSAY OF FREE THYROXINE: CPT

## 2023-03-16 PROCEDURE — 80061 LIPID PANEL: CPT

## 2023-03-17 NOTE — PROGRESS NOTES
Pt aware of results; provider contacted.  Vitamin D and B12 are both on low normal end. Suggest to take both supplements.

## 2023-03-21 RX ORDER — DOXYCYCLINE HYCLATE 100 MG/1
100 CAPSULE ORAL 2 TIMES DAILY
Qty: 30 CAPSULE | Refills: 0 | Status: SHIPPED | OUTPATIENT
Start: 2023-03-21

## 2023-10-30 ENCOUNTER — HOSPITAL ENCOUNTER (OUTPATIENT)
Dept: CARDIOLOGY | Facility: HOSPITAL | Age: 33
Discharge: HOME | End: 2023-10-30
Payer: COMMERCIAL

## 2023-10-30 ENCOUNTER — LAB (OUTPATIENT)
Dept: LAB | Facility: HOSPITAL | Age: 33
End: 2023-10-30
Payer: COMMERCIAL

## 2023-10-30 DIAGNOSIS — I49.9 CARDIAC ARRHYTHMIA, UNSPECIFIED CARDIAC ARRHYTHMIA TYPE: Primary | ICD-10-CM

## 2023-10-30 DIAGNOSIS — I47.10 SVT (SUPRAVENTRICULAR TACHYCARDIA): Primary | ICD-10-CM

## 2023-10-30 DIAGNOSIS — D64.9 ANEMIA, UNSPECIFIED TYPE: ICD-10-CM

## 2023-10-30 DIAGNOSIS — I49.9 CARDIAC ARRHYTHMIA, UNSPECIFIED CARDIAC ARRHYTHMIA TYPE: ICD-10-CM

## 2023-10-30 LAB
ALBUMIN SERPL-MCNC: 4.4 G/DL (ref 3.5–5.2)
ANION GAP SERPL CALCULATED.3IONS-SCNC: 9 MMOL/L (ref 5–15)
BASOPHILS # BLD AUTO: 0.04 10*3/MM3 (ref 0–0.2)
BASOPHILS NFR BLD AUTO: 0.5 % (ref 0–1.5)
BUN SERPL-MCNC: 16 MG/DL (ref 6–20)
BUN/CREAT SERPL: 18.8 (ref 7–25)
CALCIUM SPEC-SCNC: 9.8 MG/DL (ref 8.6–10.5)
CHLORIDE SERPL-SCNC: 107 MMOL/L (ref 98–107)
CO2 SERPL-SCNC: 24 MMOL/L (ref 22–29)
CREAT SERPL-MCNC: 0.85 MG/DL (ref 0.57–1)
DEPRECATED RDW RBC AUTO: 38.4 FL (ref 37–54)
EGFRCR SERPLBLD CKD-EPI 2021: 94.1 ML/MIN/1.73
EOSINOPHIL # BLD AUTO: 0.26 10*3/MM3 (ref 0–0.4)
EOSINOPHIL NFR BLD AUTO: 3.4 % (ref 0.3–6.2)
ERYTHROCYTE [DISTWIDTH] IN BLOOD BY AUTOMATED COUNT: 13 % (ref 12.3–15.4)
GLUCOSE SERPL-MCNC: 90 MG/DL (ref 65–99)
HCT VFR BLD AUTO: 35.9 % (ref 34–46.6)
HGB BLD-MCNC: 11.8 G/DL (ref 12–15.9)
IMM GRANULOCYTES # BLD AUTO: 0.02 10*3/MM3 (ref 0–0.05)
IMM GRANULOCYTES NFR BLD AUTO: 0.3 % (ref 0–0.5)
LYMPHOCYTES # BLD AUTO: 2.07 10*3/MM3 (ref 0.7–3.1)
LYMPHOCYTES NFR BLD AUTO: 27 % (ref 19.6–45.3)
MCH RBC QN AUTO: 27 PG (ref 26.6–33)
MCHC RBC AUTO-ENTMCNC: 32.9 G/DL (ref 31.5–35.7)
MCV RBC AUTO: 82.2 FL (ref 79–97)
MONOCYTES # BLD AUTO: 0.6 10*3/MM3 (ref 0.1–0.9)
MONOCYTES NFR BLD AUTO: 7.8 % (ref 5–12)
NEUTROPHILS NFR BLD AUTO: 4.68 10*3/MM3 (ref 1.7–7)
NEUTROPHILS NFR BLD AUTO: 61 % (ref 42.7–76)
NRBC BLD AUTO-RTO: 0 /100 WBC (ref 0–0.2)
PHOSPHATE SERPL-MCNC: 3.2 MG/DL (ref 2.5–4.5)
PLATELET # BLD AUTO: 291 10*3/MM3 (ref 140–450)
PMV BLD AUTO: 9.7 FL (ref 6–12)
POTASSIUM SERPL-SCNC: 3.7 MMOL/L (ref 3.5–5.2)
QT INTERVAL: 411 MS
QTC INTERVAL: 406 MS
RBC # BLD AUTO: 4.37 10*6/MM3 (ref 3.77–5.28)
SODIUM SERPL-SCNC: 140 MMOL/L (ref 136–145)
T-UPTAKE NFR SERPL: 1.04 TBI (ref 0.8–1.3)
T4 SERPL-MCNC: 6.59 MCG/DL (ref 4.5–11.7)
TSH SERPL DL<=0.05 MIU/L-ACNC: 1.98 UIU/ML (ref 0.27–4.2)
WBC NRBC COR # BLD: 7.67 10*3/MM3 (ref 3.4–10.8)

## 2023-10-30 PROCEDURE — 80069 RENAL FUNCTION PANEL: CPT

## 2023-10-30 PROCEDURE — 82306 VITAMIN D 25 HYDROXY: CPT

## 2023-10-30 PROCEDURE — 84436 ASSAY OF TOTAL THYROXINE: CPT

## 2023-10-30 PROCEDURE — 84443 ASSAY THYROID STIM HORMONE: CPT

## 2023-10-30 PROCEDURE — 85025 COMPLETE CBC W/AUTO DIFF WBC: CPT

## 2023-10-30 PROCEDURE — 93005 ELECTROCARDIOGRAM TRACING: CPT | Performed by: INTERNAL MEDICINE

## 2023-10-30 PROCEDURE — 82607 VITAMIN B-12: CPT

## 2023-10-30 PROCEDURE — 83921 ORGANIC ACID SINGLE QUANT: CPT

## 2023-10-30 PROCEDURE — 84479 ASSAY OF THYROID (T3 OR T4): CPT

## 2023-10-30 PROCEDURE — 36415 COLL VENOUS BLD VENIPUNCTURE: CPT

## 2023-10-31 LAB
25(OH)D3 SERPL-MCNC: 39.3 NG/ML (ref 30–100)
VIT B12 BLD-MCNC: 313 PG/ML (ref 211–946)

## 2023-11-02 ENCOUNTER — OFFICE VISIT (OUTPATIENT)
Dept: CARDIOLOGY | Facility: CLINIC | Age: 33
End: 2023-11-02
Payer: COMMERCIAL

## 2023-11-02 VITALS
DIASTOLIC BLOOD PRESSURE: 74 MMHG | WEIGHT: 156 LBS | HEART RATE: 64 BPM | BODY MASS INDEX: 23.64 KG/M2 | SYSTOLIC BLOOD PRESSURE: 120 MMHG | HEIGHT: 68 IN

## 2023-11-02 DIAGNOSIS — I49.9 CARDIAC ARRHYTHMIA, UNSPECIFIED CARDIAC ARRHYTHMIA TYPE: Primary | ICD-10-CM

## 2023-11-02 DIAGNOSIS — I47.10 PSVT (PAROXYSMAL SUPRAVENTRICULAR TACHYCARDIA): ICD-10-CM

## 2023-11-02 PROCEDURE — 99203 OFFICE O/P NEW LOW 30 MIN: CPT | Performed by: INTERNAL MEDICINE

## 2023-11-02 RX ORDER — VALACYCLOVIR HYDROCHLORIDE 500 MG/1
500 TABLET, FILM COATED ORAL AS NEEDED
COMMUNITY
Start: 2023-08-10

## 2023-11-02 NOTE — ASSESSMENT & PLAN NOTE
Patient with episode that sounds consistent of paroxysmal SVT not captured on any telemetry strips or monitoring devices.  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  Her baseline EKG does not show any aberrant conduction accessory pathway issues.  Would recommend Holter monitor and echocardiogram for further assessment.  If this though is within normal limits counseled patient on vagal maneuvers initially and we will see how much if any recurrent clinical events occurred to further guide therapy.  With the addition of possible beta-blocker or calcium channel blocker as first-line agents and if confirmed to be AVNRT the possibility of an ablation procedure if recurrent frequent episodes affecting her quality of life.  Did encourage patient also to consider getting her using a Apple Watch or other EKG monitoring device for recurrent events in the future.  Do not feel the patient has to limit her physical activity and continue with her aerobic activity regimen.

## 2023-11-02 NOTE — PROGRESS NOTES
"Chief Complaint  Establish Care and Rapid Heart Rate      History of Present Illness  Yady Rogers presents to [facility] CARDIOLOGY  Patient is a 33-year-old female with no significant past medical history who presented with an episode that occurred while laying in bed and tachycardia when her heart rate suddenly dropped to the 180s range she reported feeling palpitations fluttering and arm numbness at that time.  When her pulse was taken it was in the 180s she did attempt to bear down and the heart rate terminated she is not sure if this was the exact reason for the termination and this episode lasted for about a 45 seconds or so.  She has not had a recurrent episode since then she does exercise regularly with aerobic physical activity and has not been experiencing any change in her breathing capacity.  Patient has not had any syncope or presyncopal episodes      History reviewed. No pertinent past medical history.      Current Outpatient Medications:     valACYclovir (VALTREX) 500 MG tablet, Take 1 tablet by mouth As Needed., Disp: , Rfl:     Medications Discontinued During This Encounter   Medication Reason    doxycycline (VIBRAMYCIN) 100 MG capsule *Therapy completed     Allergies   Allergen Reactions    Fluarix [Influenza Virus Vaccine] Unknown - High Severity        Social History     Tobacco Use    Smoking status: Never    Smokeless tobacco: Never   Substance Use Topics    Alcohol use: Never    Drug use: Never       Family History   Problem Relation Age of Onset    Cervical cancer Mother         Objective     /74   Pulse 64   Ht 172.7 cm (68\")   Wt 70.8 kg (156 lb)   BMI 23.72 kg/m²       Physical Exam    General Appearance:   no acute distress  Alert and oriented x3  HENT:   lips not cyanotic  Atraumatic  Neck:  No jvd   supple  Respiratory:  no respiratory distress  normal breath sounds  no rales  Cardiovascular:  Regular rate and rhythm  no S3, no S4   no murmur  no " "rub  Extremities  No cyanosis  lower extremity edema: none    Skin:   warm, dry  No rashes    Result Review :     No results found for: \"PROBNP\"  CMP          3/16/2023    14:50 10/30/2023    14:31   CMP   Glucose 87  90    BUN 14  16    Creatinine 0.72  0.85    EGFR 114.8  94.1    Sodium 137  140    Potassium 3.5  3.7    Chloride 104  107    Calcium 9.2  9.8    Total Protein 7.1     Albumin 4.3  4.4    Globulin 2.8     Total Bilirubin 0.5     Alkaline Phosphatase 57     AST (SGOT) 18     ALT (SGPT) 10     Albumin/Globulin Ratio 1.5     BUN/Creatinine Ratio 19.4  18.8    Anion Gap 9.0  9.0      CBC w/diff          3/16/2023    14:50 10/30/2023    14:31   CBC w/Diff   WBC 7.18  7.67    RBC 4.31  4.37    Hemoglobin 11.8  11.8    Hematocrit 36.3  35.9    MCV 84.2  82.2    MCH 27.4  27.0    MCHC 32.5  32.9    RDW 13.0  13.0    Platelets 277  291    Neutrophil Rel % 62.8  61.0    Immature Granulocyte Rel % 0.1  0.3    Lymphocyte Rel % 29.7  27.0    Monocyte Rel % 7.0  7.8    Eosinophil Rel % 0.0  3.4    Basophil Rel % 0.4  0.5       Lab Results   Component Value Date    TSH 1.980 10/30/2023      Lab Results   Component Value Date    FREET4 1.23 03/16/2023      No results found for: \"DDIMERQUANT\"  No results found for: \"MG\"   No results found for: \"DIGOXIN\"   No results found for: \"TROPONINT\"   No results found for: \"POCTROP\"(       Lipid Panel          3/16/2023    14:50   Lipid Panel   Total Cholesterol 152    Triglycerides 35    HDL Cholesterol 61    VLDL Cholesterol 8    LDL Cholesterol  83    LDL/HDL Ratio 1.38             No results found for this or any previous visit.             The ASCVD Risk score (Mary SMITH, et al., 2019) failed to calculate for the following reasons:    The 2019 ASCVD risk score is only valid for ages 40 to 79     Diagnoses and all orders for this visit:    1. Cardiac arrhythmia, unspecified cardiac arrhythmia type (Primary)    2. PSVT (paroxysmal supraventricular tachycardia)  Assessment & " Plan:  Patient with episode that sounds consistent of paroxysmal SVT not captured on any telemetry strips or monitoring devices.  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  Her baseline EKG does not show any aberrant conduction accessory pathway issues.  Would recommend Holter monitor and echocardiogram for further assessment.  If this though is within normal limits counseled patient on vagal maneuvers initially and we will see how much if any recurrent clinical events occurred to further guide therapy.  With the addition of possible beta-blocker or calcium channel blocker as first-line agents and if confirmed to be AVNRT the possibility of an ablation procedure if recurrent frequent episodes affecting her quality of life.  Did encourage patient also to consider getting her using a Apple Watch or other EKG monitoring device for recurrent events in the future.  Do not feel the patient has to limit her physical activity and continue with her aerobic activity regimen.              Follow Up     Return in about 6 months (around 5/2/2024).          Patient was given instructions and counseling regarding her condition or for health maintenance advice. Please see specific information pulled into the AVS if appropriate.

## 2023-11-03 LAB — METHYLMALONATE SERPL-SCNC: 165 NMOL/L (ref 0–378)

## 2023-11-17 ENCOUNTER — HOSPITAL ENCOUNTER (OUTPATIENT)
Dept: CARDIOLOGY | Facility: HOSPITAL | Age: 33
Discharge: HOME | End: 2023-11-17
Payer: COMMERCIAL

## 2023-11-17 DIAGNOSIS — I47.10 SVT (SUPRAVENTRICULAR TACHYCARDIA): ICD-10-CM

## 2023-11-17 DIAGNOSIS — I49.9 CARDIAC ARRHYTHMIA, UNSPECIFIED CARDIAC ARRHYTHMIA TYPE: ICD-10-CM

## 2023-11-17 LAB
BH CV ECHO MEAS - AO MAX PG: 6 MMHG
BH CV ECHO MEAS - AO MEAN PG: 3 MMHG
BH CV ECHO MEAS - AO ROOT DIAM: 2.7 CM
BH CV ECHO MEAS - AO V2 MAX: 117 CM/SEC
BH CV ECHO MEAS - AO V2 VTI: 27.4 CM
BH CV ECHO MEAS - AVA(I,D): 2.22 CM2
BH CV ECHO MEAS - EDV(CUBED): 64 ML
BH CV ECHO MEAS - EDV(MOD-SP2): 102 ML
BH CV ECHO MEAS - EDV(MOD-SP4): 70.2 ML
BH CV ECHO MEAS - EF(MOD-BP): 65.2 %
BH CV ECHO MEAS - EF(MOD-SP2): 68.3 %
BH CV ECHO MEAS - EF(MOD-SP4): 65.1 %
BH CV ECHO MEAS - ESV(CUBED): 17.6 ML
BH CV ECHO MEAS - ESV(MOD-SP2): 32.3 ML
BH CV ECHO MEAS - ESV(MOD-SP4): 24.5 ML
BH CV ECHO MEAS - FS: 35 %
BH CV ECHO MEAS - IVS/LVPW: 1.43 CM
BH CV ECHO MEAS - IVSD: 1 CM
BH CV ECHO MEAS - LA DIMENSION: 2.6 CM
BH CV ECHO MEAS - LAT PEAK E' VEL: 22.8 CM/SEC
BH CV ECHO MEAS - LV MASS(C)D: 101.4 GRAMS
BH CV ECHO MEAS - LV MAX PG: 3.6 MMHG
BH CV ECHO MEAS - LV MEAN PG: 2 MMHG
BH CV ECHO MEAS - LV V1 MAX: 95.4 CM/SEC
BH CV ECHO MEAS - LV V1 VTI: 21.5 CM
BH CV ECHO MEAS - LVIDD: 4 CM
BH CV ECHO MEAS - LVIDS: 2.6 CM
BH CV ECHO MEAS - LVOT AREA: 2.8 CM2
BH CV ECHO MEAS - LVOT DIAM: 1.9 CM
BH CV ECHO MEAS - LVPWD: 0.7 CM
BH CV ECHO MEAS - MED PEAK E' VEL: 14 CM/SEC
BH CV ECHO MEAS - MV A MAX VEL: 91.7 CM/SEC
BH CV ECHO MEAS - MV DEC SLOPE: 649 CM/SEC2
BH CV ECHO MEAS - MV DEC TIME: 0.17 SEC
BH CV ECHO MEAS - MV E MAX VEL: 94.9 CM/SEC
BH CV ECHO MEAS - MV E/A: 1.03
BH CV ECHO MEAS - MV P1/2T: 44.9 MSEC
BH CV ECHO MEAS - MVA(P1/2T): 4.9 CM2
BH CV ECHO MEAS - RVDD: 2.7 CM
BH CV ECHO MEAS - SV(LVOT): 61 ML
BH CV ECHO MEAS - SV(MOD-SP2): 69.7 ML
BH CV ECHO MEAS - SV(MOD-SP4): 45.7 ML
BH CV ECHO MEASUREMENTS AVERAGE E/E' RATIO: 5.16
IVRT: 74 MS
LEFT ATRIUM VOLUME INDEX: 15.7 ML/M2
LEFT ATRIUM VOLUME: 29 ML

## 2023-11-17 PROCEDURE — 93306 TTE W/DOPPLER COMPLETE: CPT

## 2023-11-17 PROCEDURE — 93242 EXT ECG>48HR<7D RECORDING: CPT

## 2024-01-24 ENCOUNTER — DOCUMENTATION (OUTPATIENT)
Dept: FAMILY MEDICINE CLINIC | Facility: CLINIC | Age: 34
End: 2024-01-24
Payer: COMMERCIAL

## 2024-01-24 RX ORDER — VALACYCLOVIR HYDROCHLORIDE 500 MG/1
500 TABLET, FILM COATED ORAL DAILY
Qty: 30 TABLET | Refills: 2 | Status: SHIPPED | OUTPATIENT
Start: 2024-01-24

## 2024-02-16 DIAGNOSIS — M25.561 RIGHT KNEE PAIN, UNSPECIFIED CHRONICITY: Primary | ICD-10-CM

## 2024-07-07 RX ORDER — METRONIDAZOLE 500 MG/1
500 TABLET ORAL 3 TIMES DAILY
Qty: 30 TABLET | Refills: 0 | Status: SHIPPED | OUTPATIENT
Start: 2024-07-07

## 2024-07-07 RX ORDER — CIPROFLOXACIN 500 MG/1
500 TABLET, FILM COATED ORAL 2 TIMES DAILY
Qty: 28 TABLET | Refills: 0 | Status: SHIPPED | OUTPATIENT
Start: 2024-07-07

## 2024-08-20 RX ORDER — PANTOPRAZOLE SODIUM 40 MG/1
40 TABLET, DELAYED RELEASE ORAL DAILY
Qty: 30 TABLET | Refills: 2 | Status: SHIPPED | OUTPATIENT
Start: 2024-08-20

## 2024-09-04 ENCOUNTER — OFFICE VISIT (OUTPATIENT)
Dept: FAMILY MEDICINE CLINIC | Facility: CLINIC | Age: 34
End: 2024-09-04
Payer: COMMERCIAL

## 2024-09-04 VITALS
BODY MASS INDEX: 24.2 KG/M2 | SYSTOLIC BLOOD PRESSURE: 118 MMHG | TEMPERATURE: 98.3 F | HEART RATE: 63 BPM | DIASTOLIC BLOOD PRESSURE: 72 MMHG | OXYGEN SATURATION: 100 % | WEIGHT: 159.7 LBS | HEIGHT: 68 IN

## 2024-09-04 DIAGNOSIS — R10.10 PAIN OF UPPER ABDOMEN: ICD-10-CM

## 2024-09-04 DIAGNOSIS — Z13.220 ENCOUNTER FOR SCREENING FOR LIPID DISORDER: ICD-10-CM

## 2024-09-04 DIAGNOSIS — E61.1 IRON DEFICIENCY: ICD-10-CM

## 2024-09-04 DIAGNOSIS — Z00.00 ANNUAL PHYSICAL EXAM: ICD-10-CM

## 2024-09-04 DIAGNOSIS — E53.9 VITAMIN B DEFICIENCY: ICD-10-CM

## 2024-09-04 DIAGNOSIS — K21.00 GASTROESOPHAGEAL REFLUX DISEASE WITH ESOPHAGITIS WITHOUT HEMORRHAGE: ICD-10-CM

## 2024-09-04 DIAGNOSIS — R73.01 IMPAIRED FASTING GLUCOSE: ICD-10-CM

## 2024-09-04 DIAGNOSIS — Z13.29 SCREENING FOR THYROID DISORDER: ICD-10-CM

## 2024-09-04 DIAGNOSIS — E55.9 VITAMIN D DEFICIENCY: ICD-10-CM

## 2024-09-04 DIAGNOSIS — Z76.89 ESTABLISHING CARE WITH NEW DOCTOR, ENCOUNTER FOR: Primary | ICD-10-CM

## 2024-09-04 PROCEDURE — 99395 PREV VISIT EST AGE 18-39: CPT

## 2024-09-04 PROCEDURE — 99214 OFFICE O/P EST MOD 30 MIN: CPT

## 2024-09-04 RX ORDER — FAMOTIDINE 10 MG
10 TABLET ORAL DAILY
COMMUNITY

## 2024-09-04 NOTE — PROGRESS NOTES
Yady Rogers presents to [facility] to establish care.      History of Present Illness  Yady is a 34-year-old female in the office today to establish care and discuss concerns of gastritis.    She has a medical history significant for paroxysmal SVT, for which she is seeing cardiology and currently follows them, believes that these episodes are brought on by stress.  Also has a history of gastritis, for which she previously had a scope performed by Dr. Max without any adverse findings.  Her surgical history includes a fibroadenoma removal from her breast, and a tonsillectomy.    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  She denies excess stress, reports that she eats a very healthy diet, and is very active.    Gastritis: Does have a family history of this, her mother is positive for gastritis.  She previously in 2019 also had a scope performed which revealed she did have some gastritis herself.  Reports that during this last stomach irritation episode she did begin taking Pepcid 10 mg daily, which has assisted in resolution of her symptoms.  In the last 7 days she reports only having 1 episode of stomach pain.  She reports eating a very healthy diet, denies having processed foods.  States that whenever these episodes occur her stomach feels very full and she even has this fullness feeling as if it is pushing up on her diaphragm.  Describes the discomfort as a full and burning sensation.  After discussion, believes she could potentially be having some gallbladder issues as well.    She is up-to-date with all routine screenings and vaccinations.    Past Medical History:   Diagnosis Date    SVT (supraventricular tachycardia)      Past Surgical History:    BREAST SURGERY    Fibroadenoma    TONSILLECTOMY       Social History     Socioeconomic History    Marital status:    Tobacco Use    Smoking  "status: Never     Passive exposure: Never    Smokeless tobacco: Never   Vaping Use    Vaping status: Never Used   Substance and Sexual Activity    Alcohol use: Never    Drug use: Never    Sexual activity: Defer     Birth control/protection: None       Family History   Problem Relation Age of Onset    Cervical cancer Mother          Objective   Vital Signs:   /72 (BP Location: Left arm, Patient Position: Sitting, Cuff Size: Adult)   Pulse 63   Temp 98.3 °F (36.8 °C)   Ht 172.7 cm (68\")   Wt 72.4 kg (159 lb 11.2 oz)   SpO2 100%   BMI 24.28 kg/m²     Body mass index is 24.28 kg/m².    All labs, imaging, test results, and specialty provider notes reviewed with patient.       Physical Exam  Constitutional:       Appearance: Normal appearance.   HENT:      Right Ear: Ear canal and external ear normal.      Left Ear: Ear canal and external ear normal.      Mouth/Throat:      Mouth: Mucous membranes are moist.      Pharynx: Oropharynx is clear.   Eyes:      Pupils: Pupils are equal, round, and reactive to light.   Neck:      Thyroid: No thyroid mass or thyromegaly.   Cardiovascular:      Rate and Rhythm: Normal rate and regular rhythm.      Pulses: Normal pulses.      Heart sounds: Normal heart sounds.   Pulmonary:      Effort: Pulmonary effort is normal.      Breath sounds: Normal breath sounds.   Abdominal:      General: Abdomen is flat. Bowel sounds are normal. There is no distension.      Palpations: Abdomen is soft.      Tenderness: There is no abdominal tenderness.   Genitourinary:     Comments: Deferred  Musculoskeletal:         General: Normal range of motion.      Cervical back: Normal range of motion and neck supple.   Lymphadenopathy:      Cervical: No cervical adenopathy.   Skin:     General: Skin is warm and dry.   Neurological:      General: No focal deficit present.      Mental Status: She is alert and oriented to person, place, and time.   Psychiatric:         Mood and Affect: Mood normal.        "  Behavior: Behavior normal.                  BMI is within normal parameters. No other follow-up for BMI required.            Assessment and Plan:  Diagnoses and all orders for this visit:    1. Establishing care with new doctor, encounter for (Primary)    2. Annual physical exam  Comments:  Labs ordered, anticipatory guidance discussed with patient.  Orders:  -     CBC Auto Differential; Future  -     Comprehensive Metabolic Panel; Future  -     Urinalysis With Culture If Indicated -; Future    3. Pain of upper abdomen  Comments:  Routine labs ordered, gallbladder ultrasound also ordered at this time, to rule out potential cholecystitis.  Will notify patient of results.  Continue Pepcid.  Orders:  -     US Gallbladder; Future    4. Encounter for screening for lipid disorder  -     Lipid Panel; Future    5. Screening for thyroid disorder  -     TSH Rfx On Abnormal To Free T4; Future    6. Impaired fasting glucose  -     Hemoglobin A1c; Future    7. Vitamin B deficiency  -     Vitamin B12 & Folate; Future    8. Vitamin D deficiency  -     Vitamin D,25-Hydroxy; Future    9. Iron deficiency  -     Ferritin; Future  -     Iron Profile; Future    10. Gastroesophageal reflux disease with esophagitis without hemorrhage  -     US Gallbladder; Future      Anticipatory Guidelines discussed with patient.     Discussed getting adequate exercise, reduced TV/electronic time, car safety including seat belt use, sexual activity (if applicable), and smoking/alcohol use (if applicable).    Follow Up:  Return in about 1 year (around 9/4/2025) for Annual physical, Pap Smear.    Patient was given instructions and counseling regarding her condition or for health maintenance advice. Please see specific information pulled into the AVS if appropriate.

## 2024-09-05 ENCOUNTER — LAB (OUTPATIENT)
Dept: LAB | Facility: HOSPITAL | Age: 34
End: 2024-09-05
Payer: COMMERCIAL

## 2024-09-05 DIAGNOSIS — E53.9 VITAMIN B DEFICIENCY: ICD-10-CM

## 2024-09-05 DIAGNOSIS — Z00.00 ANNUAL PHYSICAL EXAM: ICD-10-CM

## 2024-09-05 DIAGNOSIS — E55.9 VITAMIN D DEFICIENCY: ICD-10-CM

## 2024-09-05 DIAGNOSIS — Z13.29 SCREENING FOR THYROID DISORDER: ICD-10-CM

## 2024-09-05 DIAGNOSIS — Z13.220 ENCOUNTER FOR SCREENING FOR LIPID DISORDER: ICD-10-CM

## 2024-09-05 DIAGNOSIS — R73.01 IMPAIRED FASTING GLUCOSE: ICD-10-CM

## 2024-09-05 DIAGNOSIS — E61.1 IRON DEFICIENCY: ICD-10-CM

## 2024-09-05 LAB
25(OH)D3 SERPL-MCNC: 41.5 NG/ML (ref 30–100)
ALBUMIN SERPL-MCNC: 4.1 G/DL (ref 3.5–5.2)
ALBUMIN/GLOB SERPL: 1.5 G/DL
ALP SERPL-CCNC: 66 U/L (ref 39–117)
ALT SERPL W P-5'-P-CCNC: 11 U/L (ref 1–33)
ANION GAP SERPL CALCULATED.3IONS-SCNC: 7 MMOL/L (ref 5–15)
AST SERPL-CCNC: 14 U/L (ref 1–32)
BACTERIA UR QL AUTO: NORMAL /HPF
BASOPHILS # BLD AUTO: 0.05 10*3/MM3 (ref 0–0.2)
BASOPHILS NFR BLD AUTO: 0.8 % (ref 0–1.5)
BILIRUB SERPL-MCNC: 0.2 MG/DL (ref 0–1.2)
BILIRUB UR QL STRIP: NEGATIVE
BUN SERPL-MCNC: 17 MG/DL (ref 6–20)
BUN/CREAT SERPL: 26.6 (ref 7–25)
CALCIUM SPEC-SCNC: 9.2 MG/DL (ref 8.6–10.5)
CHLORIDE SERPL-SCNC: 105 MMOL/L (ref 98–107)
CHOLEST SERPL-MCNC: 154 MG/DL (ref 0–200)
CLARITY UR: CLEAR
CO2 SERPL-SCNC: 25 MMOL/L (ref 22–29)
COLOR UR: YELLOW
CREAT SERPL-MCNC: 0.64 MG/DL (ref 0.57–1)
DEPRECATED RDW RBC AUTO: 42.2 FL (ref 37–54)
EGFRCR SERPLBLD CKD-EPI 2021: 120.6 ML/MIN/1.73
EOSINOPHIL # BLD AUTO: 0.01 10*3/MM3 (ref 0–0.4)
EOSINOPHIL NFR BLD AUTO: 0.2 % (ref 0.3–6.2)
ERYTHROCYTE [DISTWIDTH] IN BLOOD BY AUTOMATED COUNT: 13.6 % (ref 12.3–15.4)
FERRITIN SERPL-MCNC: 29.6 NG/ML (ref 13–150)
FOLATE SERPL-MCNC: 6.91 NG/ML (ref 4.78–24.2)
GLOBULIN UR ELPH-MCNC: 2.7 GM/DL
GLUCOSE SERPL-MCNC: 85 MG/DL (ref 65–99)
GLUCOSE UR STRIP-MCNC: NEGATIVE MG/DL
HBA1C MFR BLD: 5.5 % (ref 4.8–5.6)
HCT VFR BLD AUTO: 37.8 % (ref 34–46.6)
HDLC SERPL-MCNC: 60 MG/DL (ref 40–60)
HGB BLD-MCNC: 12.1 G/DL (ref 12–15.9)
HGB UR QL STRIP.AUTO: NEGATIVE
HOLD SPECIMEN: NORMAL
HYALINE CASTS UR QL AUTO: NORMAL /LPF
IMM GRANULOCYTES # BLD AUTO: 0.01 10*3/MM3 (ref 0–0.05)
IMM GRANULOCYTES NFR BLD AUTO: 0.2 % (ref 0–0.5)
IRON 24H UR-MRATE: 49 MCG/DL (ref 37–145)
IRON SATN MFR SERPL: 10 % (ref 20–50)
KETONES UR QL STRIP: NEGATIVE
LDLC SERPL CALC-MCNC: 85 MG/DL (ref 0–100)
LDLC/HDLC SERPL: 1.44 {RATIO}
LEUKOCYTE ESTERASE UR QL STRIP.AUTO: ABNORMAL
LYMPHOCYTES # BLD AUTO: 1.62 10*3/MM3 (ref 0.7–3.1)
LYMPHOCYTES NFR BLD AUTO: 24.7 % (ref 19.6–45.3)
MCH RBC QN AUTO: 27.1 PG (ref 26.6–33)
MCHC RBC AUTO-ENTMCNC: 32 G/DL (ref 31.5–35.7)
MCV RBC AUTO: 84.8 FL (ref 79–97)
MONOCYTES # BLD AUTO: 0.39 10*3/MM3 (ref 0.1–0.9)
MONOCYTES NFR BLD AUTO: 5.9 % (ref 5–12)
NEUTROPHILS NFR BLD AUTO: 4.49 10*3/MM3 (ref 1.7–7)
NEUTROPHILS NFR BLD AUTO: 68.2 % (ref 42.7–76)
NITRITE UR QL STRIP: NEGATIVE
NRBC BLD AUTO-RTO: 0 /100 WBC (ref 0–0.2)
PH UR STRIP.AUTO: 7.5 [PH] (ref 5–8)
PLATELET # BLD AUTO: 273 10*3/MM3 (ref 140–450)
PMV BLD AUTO: 9.6 FL (ref 6–12)
POTASSIUM SERPL-SCNC: 3.7 MMOL/L (ref 3.5–5.2)
PROT SERPL-MCNC: 6.8 G/DL (ref 6–8.5)
PROT UR QL STRIP: NEGATIVE
RBC # BLD AUTO: 4.46 10*6/MM3 (ref 3.77–5.28)
RBC # UR STRIP: NORMAL /HPF
REF LAB TEST METHOD: NORMAL
SODIUM SERPL-SCNC: 137 MMOL/L (ref 136–145)
SP GR UR STRIP: 1.01 (ref 1–1.03)
SQUAMOUS #/AREA URNS HPF: NORMAL /HPF
TIBC SERPL-MCNC: 510 MCG/DL (ref 298–536)
TRANSFERRIN SERPL-MCNC: 342 MG/DL (ref 200–360)
TRIGL SERPL-MCNC: 39 MG/DL (ref 0–150)
TSH SERPL DL<=0.05 MIU/L-ACNC: 2.42 UIU/ML (ref 0.27–4.2)
UROBILINOGEN UR QL STRIP: ABNORMAL
VIT B12 BLD-MCNC: 612 PG/ML (ref 211–946)
VLDLC SERPL-MCNC: 9 MG/DL (ref 5–40)
WBC # UR STRIP: NORMAL /HPF
WBC NRBC COR # BLD AUTO: 6.57 10*3/MM3 (ref 3.4–10.8)

## 2024-09-05 PROCEDURE — 82306 VITAMIN D 25 HYDROXY: CPT

## 2024-09-05 PROCEDURE — 82746 ASSAY OF FOLIC ACID SERUM: CPT

## 2024-09-05 PROCEDURE — 80050 GENERAL HEALTH PANEL: CPT

## 2024-09-05 PROCEDURE — 82607 VITAMIN B-12: CPT

## 2024-09-05 PROCEDURE — 80061 LIPID PANEL: CPT

## 2024-09-05 PROCEDURE — 81001 URINALYSIS AUTO W/SCOPE: CPT

## 2024-09-05 PROCEDURE — 36415 COLL VENOUS BLD VENIPUNCTURE: CPT

## 2024-09-05 PROCEDURE — 84466 ASSAY OF TRANSFERRIN: CPT

## 2024-09-05 PROCEDURE — 83540 ASSAY OF IRON: CPT

## 2024-09-05 PROCEDURE — 82728 ASSAY OF FERRITIN: CPT

## 2024-09-05 PROCEDURE — 83036 HEMOGLOBIN GLYCOSYLATED A1C: CPT

## 2024-10-02 ENCOUNTER — DOCUMENTATION (OUTPATIENT)
Dept: FAMILY MEDICINE CLINIC | Facility: CLINIC | Age: 34
End: 2024-10-02
Payer: COMMERCIAL

## 2024-10-02 RX ORDER — FLUCONAZOLE 150 MG/1
150 TABLET ORAL
Qty: 2 TABLET | Refills: 0 | Status: SHIPPED | OUTPATIENT
Start: 2024-10-02

## 2025-06-02 ENCOUNTER — OFFICE VISIT (OUTPATIENT)
Dept: GASTROENTEROLOGY | Facility: CLINIC | Age: 35
End: 2025-06-02
Payer: COMMERCIAL

## 2025-06-02 ENCOUNTER — LAB (OUTPATIENT)
Facility: HOSPITAL | Age: 35
End: 2025-06-02
Payer: COMMERCIAL

## 2025-06-02 ENCOUNTER — RESULTS FOLLOW-UP (OUTPATIENT)
Dept: GASTROENTEROLOGY | Facility: CLINIC | Age: 35
End: 2025-06-02

## 2025-06-02 VITALS
WEIGHT: 161.8 LBS | DIASTOLIC BLOOD PRESSURE: 66 MMHG | HEIGHT: 68 IN | SYSTOLIC BLOOD PRESSURE: 116 MMHG | BODY MASS INDEX: 24.52 KG/M2 | HEART RATE: 67 BPM

## 2025-06-02 DIAGNOSIS — R14.0 ABDOMINAL BLOATING: ICD-10-CM

## 2025-06-02 DIAGNOSIS — R10.13 EPIGASTRIC PAIN: ICD-10-CM

## 2025-06-02 DIAGNOSIS — K59.04 CHRONIC IDIOPATHIC CONSTIPATION: ICD-10-CM

## 2025-06-02 DIAGNOSIS — R10.84 GENERALIZED ABDOMINAL PAIN: ICD-10-CM

## 2025-06-02 DIAGNOSIS — R10.84 GENERALIZED ABDOMINAL PAIN: Primary | ICD-10-CM

## 2025-06-02 LAB
ALBUMIN SERPL-MCNC: 4 G/DL (ref 3.5–5.2)
ALBUMIN/GLOB SERPL: 1.4 G/DL
ALP SERPL-CCNC: 70 U/L (ref 39–117)
ALT SERPL W P-5'-P-CCNC: 11 U/L (ref 1–33)
ANION GAP SERPL CALCULATED.3IONS-SCNC: 10 MMOL/L (ref 5–15)
AST SERPL-CCNC: 17 U/L (ref 1–32)
BASOPHILS # BLD AUTO: 0.04 10*3/MM3 (ref 0–0.2)
BASOPHILS NFR BLD AUTO: 0.7 % (ref 0–1.5)
BILIRUB SERPL-MCNC: 0.2 MG/DL (ref 0–1.2)
BUN SERPL-MCNC: 14 MG/DL (ref 6–20)
BUN/CREAT SERPL: 20.6 (ref 7–25)
CALCIUM SPEC-SCNC: 9.6 MG/DL (ref 8.6–10.5)
CHLORIDE SERPL-SCNC: 108 MMOL/L (ref 98–107)
CO2 SERPL-SCNC: 20 MMOL/L (ref 22–29)
CREAT SERPL-MCNC: 0.68 MG/DL (ref 0.57–1)
DEPRECATED RDW RBC AUTO: 40.7 FL (ref 37–54)
EGFRCR SERPLBLD CKD-EPI 2021: 118.1 ML/MIN/1.73
EOSINOPHIL # BLD AUTO: 0.59 10*3/MM3 (ref 0–0.4)
EOSINOPHIL NFR BLD AUTO: 9.8 % (ref 0.3–6.2)
ERYTHROCYTE [DISTWIDTH] IN BLOOD BY AUTOMATED COUNT: 12.4 % (ref 12.3–15.4)
GLOBULIN UR ELPH-MCNC: 2.8 GM/DL
GLUCOSE SERPL-MCNC: 96 MG/DL (ref 65–99)
HCT VFR BLD AUTO: 42.3 % (ref 34–46.6)
HGB BLD-MCNC: 13.3 G/DL (ref 12–15.9)
IMM GRANULOCYTES # BLD AUTO: 0.01 10*3/MM3 (ref 0–0.05)
IMM GRANULOCYTES NFR BLD AUTO: 0.2 % (ref 0–0.5)
IRON 24H UR-MRATE: 99 MCG/DL (ref 37–145)
IRON SATN MFR SERPL: 19 % (ref 20–50)
LYMPHOCYTES # BLD AUTO: 1.56 10*3/MM3 (ref 0.7–3.1)
LYMPHOCYTES NFR BLD AUTO: 25.9 % (ref 19.6–45.3)
MCH RBC QN AUTO: 28.5 PG (ref 26.6–33)
MCHC RBC AUTO-ENTMCNC: 31.4 G/DL (ref 31.5–35.7)
MCV RBC AUTO: 90.8 FL (ref 79–97)
MONOCYTES # BLD AUTO: 0.36 10*3/MM3 (ref 0.1–0.9)
MONOCYTES NFR BLD AUTO: 6 % (ref 5–12)
NEUTROPHILS NFR BLD AUTO: 3.46 10*3/MM3 (ref 1.7–7)
NEUTROPHILS NFR BLD AUTO: 57.4 % (ref 42.7–76)
NRBC BLD AUTO-RTO: 0 /100 WBC (ref 0–0.2)
PLATELET # BLD AUTO: 248 10*3/MM3 (ref 140–450)
PMV BLD AUTO: 10.4 FL (ref 6–12)
POTASSIUM SERPL-SCNC: 4 MMOL/L (ref 3.5–5.2)
PROT SERPL-MCNC: 6.8 G/DL (ref 6–8.5)
RBC # BLD AUTO: 4.66 10*6/MM3 (ref 3.77–5.28)
SODIUM SERPL-SCNC: 138 MMOL/L (ref 136–145)
TIBC SERPL-MCNC: 517 MCG/DL (ref 298–536)
TRANSFERRIN SERPL-MCNC: 347 MG/DL (ref 200–360)
VIT B12 BLD-MCNC: 465 PG/ML (ref 211–946)
WBC NRBC COR # BLD AUTO: 6.02 10*3/MM3 (ref 3.4–10.8)

## 2025-06-02 PROCEDURE — 86003 ALLG SPEC IGE CRUDE XTRC EA: CPT

## 2025-06-02 PROCEDURE — 36415 COLL VENOUS BLD VENIPUNCTURE: CPT

## 2025-06-02 PROCEDURE — 85025 COMPLETE CBC W/AUTO DIFF WBC: CPT | Performed by: NURSE PRACTITIONER

## 2025-06-02 PROCEDURE — 80053 COMPREHEN METABOLIC PANEL: CPT

## 2025-06-02 PROCEDURE — 84466 ASSAY OF TRANSFERRIN: CPT

## 2025-06-02 PROCEDURE — 82785 ASSAY OF IGE: CPT

## 2025-06-02 PROCEDURE — 86364 TISS TRNSGLTMNASE EA IG CLAS: CPT

## 2025-06-02 PROCEDURE — 83540 ASSAY OF IRON: CPT

## 2025-06-02 PROCEDURE — 82784 ASSAY IGA/IGD/IGG/IGM EACH: CPT

## 2025-06-02 PROCEDURE — 86008 ALLG SPEC IGE RECOMB EA: CPT

## 2025-06-02 PROCEDURE — 82607 VITAMIN B-12: CPT | Performed by: NURSE PRACTITIONER

## 2025-06-02 PROCEDURE — 86258 DGP ANTIBODY EACH IG CLASS: CPT

## 2025-06-02 RX ORDER — POLYETHYLENE GLYCOL 3350, SODIUM SULFATE, POTASSIUM CHLORIDE, MAGNESIUM SULFATE, AND SODIUM CHLORIDE FOR ORAL SOLUTION 178.7-7.3G
178.7 KIT ORAL TAKE AS DIRECTED
Qty: 1 EACH | Refills: 0 | Status: SHIPPED | OUTPATIENT
Start: 2025-06-02

## 2025-06-02 NOTE — PROGRESS NOTES
Chief Complaint     Abdominal Pain and Anemia    Patient or patient representative verbalized consent for the use of Ambient Listening during the visit with  GUTIERREZ Linda for chart documentation. 6/2/2025  08:24 EDT    History of Present Illness       History of Present Illness  The patient presents for evaluation of abdominal pain, constipation, iron deficiency, and B12 deficiency.    She underwent an upper endoscopy in 2018 due to abdominal discomfort, which revealed gastritis and metaplasia at the antrum of the stomach. Treatment with Protonix and Carafate alleviated her symptoms. However, recurrent symptoms have been present since 07/2024. She reports diffuse abdominal pain, predominantly postprandial, lasting up to an hour, occurring almost daily.     Intermittent reflux symptoms and nausea occur, particularly after consuming spicy foods. She has not been tested for gluten allergy. She reports no tick bites and has not undergone a colonoscopy. NSAID use is infrequent, with the last use being during high school for back pain. Alcohol consumption has been abstained for over a year, and she maintains a healthy diet, avoiding fast food and limiting fat intake to 60 g per day. Dairy products have been discontinued, and gluten intake is limited, suspecting a dietary cause.    Significant bloating after meals and constipation are reported, with bowel movements occurring every 5 days. Occasional maroon stools are attributed to intermittent iron supplementation and beet consumption. Occasional blood on wiping is likely due to tearing. Magnesium is taken to manage constipation, allowing bowel movements every 2 days.    A history of iron deficiency is noted, with low TSAT, ferritin, and iron levels. Iron supplements were taken for a short period when these labs were obtained but have not been taken for approximately 6 months. Severe restless legs at night and muscle pain improved with iron supplementation.  "Similar symptoms have been experienced this month, suspecting low iron levels again. Menstrual cycles last 3 days and are not heavy.    A history of B12 deficiency and poor absorption of iron and B12 is reported. Multivitamin use is not practiced.      FAMILY HISTORY  - Mother: gastritis, esophagitis  - Father: Diverticulosis, ostomy  - Negative for stomach cancer         History      Past Medical History:   Diagnosis Date    SVT (supraventricular tachycardia)        Past Surgical History:   Procedure Laterality Date    BREAST SURGERY      Fibroadenoma    TONSILLECTOMY      UPPER GASTROINTESTINAL ENDOSCOPY         Family History   Problem Relation Age of Onset    Cervical cancer Mother         Current Medications        Current Outpatient Medications:     famotidine (PEPCID) 10 MG tablet, Take 1 tablet by mouth Daily., Disp: , Rfl:     valACYclovir (VALTREX) 500 MG tablet, Take 1 tablet by mouth Daily. (Patient taking differently: Take 1 tablet by mouth Daily. PRN), Disp: 30 tablet, Rfl: 2    fluconazole (Diflucan) 150 MG tablet, Take 1 tablet by mouth Every 72 (Seventy-Two) Hours. (Patient not taking: Reported on 6/2/2025), Disp: 2 tablet, Rfl: 0    PEG 3350-KCl-NaCl-NaSulf-MgSul (Suflave) 178.7 g reconstituted solution, Take 178.7 g by mouth Take As Directed., Disp: 1 each, Rfl: 0     Allergies     Allergies   Allergen Reactions    Fluarix [Influenza Virus Vaccine] Unknown - High Severity       Social History       Social History     Social History Narrative    Not on file       Immunizations     Immunization:  Immunization History   Administered Date(s) Administered    Hep B, Adolescent or Pediatric 05/07/1996, 08/15/1996, 02/26/1997    MMR 08/26/1993, 05/07/1996    Tdap 03/18/2020          Objective     Objective     Vital Signs:   /66 (BP Location: Left arm, Patient Position: Sitting, Cuff Size: Adult)   Pulse 67   Ht 172.7 cm (67.99\")   Wt 73.4 kg (161 lb 12.8 oz)   BMI 24.61 kg/m²       Physical " Exam  Constitutional:       General: She is not in acute distress.     Appearance: Normal appearance. She is well-developed and normal weight.   HENT:      Head: Normocephalic and atraumatic.   Eyes:      Conjunctiva/sclera: Conjunctivae normal.      Pupils: Pupils are equal, round, and reactive to light.      Visual Fields: Right eye visual fields normal and left eye visual fields normal.   Cardiovascular:      Rate and Rhythm: Normal rate.   Pulmonary:      Effort: Pulmonary effort is normal. No respiratory distress or retractions.      Breath sounds: Normal air entry.   Musculoskeletal:         General: Normal range of motion.      Right lower leg: No edema.      Left lower leg: No edema.   Skin:     General: Skin is warm and dry.      Findings: No lesion.   Neurological:      General: No focal deficit present.      Mental Status: She is alert and oriented to person, place, and time.   Psychiatric:         Mood and Affect: Mood and affect normal.         Behavior: Behavior normal.         Results      Result Review :   The following data was reviewed by: GUTIERREZ Linda on 06/02/2025:    CBC w/diff          9/5/2024    07:53   CBC w/Diff   WBC 6.57    RBC 4.46    Hemoglobin 12.1    Hematocrit 37.8    MCV 84.8    MCH 27.1    MCHC 32.0    RDW 13.6    Platelets 273    Neutrophil Rel % 68.2    Immature Granulocyte Rel % 0.2    Lymphocyte Rel % 24.7    Monocyte Rel % 5.9    Eosinophil Rel % 0.2    Basophil Rel % 0.8      CMP          9/5/2024    07:53   CMP   Glucose 85    BUN 17    Creatinine 0.64    EGFR 120.6    Sodium 137    Potassium 3.7    Chloride 105    Calcium 9.2    Total Protein 6.8    Albumin 4.1    Globulin 2.7    Total Bilirubin 0.2    Alkaline Phosphatase 66    AST (SGOT) 14    ALT (SGPT) 11    Albumin/Globulin Ratio 1.5    BUN/Creatinine Ratio 26.6    Anion Gap 7.0        Iron Profile   Iron   Date Value Ref Range Status   09/05/2024 49 37 - 145 mcg/dL Final     TIBC   Date Value Ref Range  Status   09/05/2024 510 298 - 536 mcg/dL Final     Iron Saturation (TSAT)   Date Value Ref Range Status   09/05/2024 10 (L) 20 - 50 % Final     Transferrin   Date Value Ref Range Status   09/05/2024 342 200 - 360 mg/dL Final     Ferritin   Ferritin   Date Value Ref Range Status   09/05/2024 29.60 13.00 - 150.00 ng/mL Final     Vitamin D   25 Hydroxy, Vitamin D   Date Value Ref Range Status   09/05/2024 41.5 30.0 - 100.0 ng/ml Final       Results             Assessment and Plan        Assessment and Plan    Diagnoses and all orders for this visit:    1. Generalized abdominal pain (Primary)  -     Celiac Panel Reflex To Titer; Future  -     Iron Profile w/o Ferritin; Future  -     Vitamin B12  -     CBC & Differential  -     Comprehensive Metabolic Panel; Future  -     Food Allergy Profile; Future  -     Alpha-Gal IgE Panel; Future  -     Case Request; Standing  -     Case Request    2. Abdominal bloating  -     Celiac Panel Reflex To Titer; Future  -     Iron Profile w/o Ferritin; Future  -     Vitamin B12  -     CBC & Differential  -     Comprehensive Metabolic Panel; Future  -     Food Allergy Profile; Future  -     Alpha-Gal IgE Panel; Future  -     Case Request; Standing  -     Case Request  -     US Abdomen Limited; Future    3. Chronic idiopathic constipation  -     Case Request; Standing  -     Case Request    4. Epigastric pain  -     US Abdomen Limited; Future    Other orders  -     Follow Anesthesia Guidelines / Protocol; Future  -     Verify NPO; Standing  -     Verify Bowel Prep Was Successful; Standing  -     Give Tap Water Enema If Bowel Prep Insufficient; Standing  -     Obtain Informed Consent; Standing  -     PEG 3350-KCl-NaCl-NaSulf-MgSul (Suflave) 178.7 g reconstituted solution; Take 178.7 g by mouth Take As Directed.  Dispense: 1 each; Refill: 0        Assessment & Plan  1. Abdominal pain:  - Differential diagnosis includes gastritis, gastric ulcer, and gallbladder issues.  - Order an ultrasound  to evaluate the gallbladder.  - Perform colonoscopy and endoscopy to rule out any inflammation or abnormalities.  - Continue taking magnesium the week prior to the procedure to ensure regular bowel movements.  - Follow a low-residue diet one week before the procedure.    2. Constipation:  - Continue taking magnesium to ensure regular bowel movements.  - Follow a low-residue diet one week before the colonoscopy.    3. Iron deficiency:  - Test for gluten allergy and alpha-gal syndrome.  - Conduct blood work to recheck iron levels.    4. B12 deficiency:  - Conduct blood work to check B12 levels.      ESOPHAGOGASTRODUODENOSCOPY (N/A), COLONOSCOPY (N/A)  The risk of the endoscopy were discussed in detail. Possible risks/complications, benefits, and alternatives to surgical or invasive procedure have been explained to patient and/or legal guardian; risks include bleeding, infection, and perforation. Patient has been evaluated and can tolerate anesthesia and/or sedation.       Follow Up        Follow Up   Return in about 6 months (around 12/2/2025) for abdominal pain.  Patient was given instructions and counseling regarding her condition or for health maintenance advice. Please see specific information pulled into the AVS if appropriate.

## 2025-06-03 LAB
GLIADIN PEPTIDE IGA SER-ACNC: 4 UNITS (ref 0–19)
IGA SERPL-MCNC: 106 MG/DL (ref 87–352)
TTG IGA SER-ACNC: <2 U/ML (ref 0–3)

## 2025-06-04 LAB
ALPHA-GAL IGE QN: <0.1 KU/L
BEEF IGE QN: <0.1 KU/L
CONV CLASS DESCRIPTION: NORMAL
IGE SERPL-ACNC: 266 IU/ML (ref 6–495)
LAMB IGE QN: <0.1 KU/L
PORK IGE QN: <0.1 KU/L

## 2025-06-04 NOTE — TELEPHONE ENCOUNTER
Patient called and was notified of all results and recommendations. Patient verbalized understanding.

## 2025-06-06 LAB
CLAM IGE QN: 0.1 KU/L
CODFISH IGE QN: <0.1 KU/L
CONV CLASS DESCRIPTION: ABNORMAL
CORN IGE QN: 0.11 KU/L
COW MILK IGE QN: 0.32 KU/L
EGG WHITE IGE QN: 0.62 KU/L
PEANUT IGE QN: 0.26 KU/L
SCALLOP IGE QN: <0.1 KU/L
SESAME SEED IGE QN: 0.17 KU/L
SHRIMP IGE QN: 0.15 KU/L
SOYBEAN IGE QN: <0.1 KU/L
WALNUT IGE QN: <0.1 KU/L
WHEAT IGE QN: 0.2 KU/L

## 2025-06-09 NOTE — TELEPHONE ENCOUNTER
Result Note  No signs of alpha-gal per lab testing.  Multiple positives noted on food allergy panel.  Recommend referral to Allergy to discuss if formal allergy testing is indicated.    Attempted to contact pt. SERAFIN requesting a return call.

## 2025-06-12 ENCOUNTER — TELEPHONE (OUTPATIENT)
Dept: GASTROENTEROLOGY | Facility: CLINIC | Age: 35
End: 2025-06-12
Payer: COMMERCIAL

## 2025-06-12 NOTE — TELEPHONE ENCOUNTER
ENDO RECONCILIATION  Verify source of procedure(s): Office visit  If other, please list source: 6/2/25    TIME OUT-CONFIRM CORRECT PROCEDURE: EGD & Colonoscopy  Cardiology:  Pulmonology:  Blood thinner:   GLP-1:  Additional DX/indication for procedure:     Please include any other notes relevant to endo reconciliation: N/A

## 2025-07-11 ENCOUNTER — LAB (OUTPATIENT)
Dept: LAB | Facility: HOSPITAL | Age: 35
End: 2025-07-11
Payer: COMMERCIAL

## 2025-07-11 ENCOUNTER — HOSPITAL ENCOUNTER (OUTPATIENT)
Dept: ULTRASOUND IMAGING | Facility: HOSPITAL | Age: 35
Discharge: HOME | End: 2025-07-11
Payer: COMMERCIAL

## 2025-07-11 DIAGNOSIS — R10.13 EPIGASTRIC PAIN: ICD-10-CM

## 2025-07-11 DIAGNOSIS — K76.0 FATTY LIVER: ICD-10-CM

## 2025-07-11 DIAGNOSIS — R14.0 ABDOMINAL BLOATING: ICD-10-CM

## 2025-07-11 PROCEDURE — 36415 COLL VENOUS BLD VENIPUNCTURE: CPT

## 2025-07-11 PROCEDURE — 82104 ALPHA-1-ANTITRYPSIN PHENO: CPT

## 2025-07-11 PROCEDURE — 82103 ALPHA-1-ANTITRYPSIN TOTAL: CPT

## 2025-07-11 PROCEDURE — 76705 ECHO EXAM OF ABDOMEN: CPT

## 2025-07-14 NOTE — PAT
Reviewed the following with patient.    Arrival time of 1000.    Must have  over 18 for transportation home post procedure. Come to [facility], entrance [address].     Education provided on laxative administration; bowel prep to be taken in two doses. Reviewed diet instructions for day prior to procedure. Only plain, unflavored water after midnight until two hours prior to arrival time.     Do not take any morning medications on the day of the procedure. Instead bring all prescribed medication and inhalers to the hospital the morning of the procedure. May take any nebulizer treatments or inhalers the morning of the procedure.     Plan to be here 3-4 hours.   Do not bring any valuables to hospital with you. Call Endo department for any questions.     Pt verbalized understanding of instructions.

## 2025-07-15 ENCOUNTER — PATIENT ROUNDING (BHMG ONLY) (OUTPATIENT)
Dept: GASTROENTEROLOGY | Facility: HOSPITAL | Age: 35
End: 2025-07-15
Payer: COMMERCIAL

## 2025-07-15 NOTE — PROGRESS NOTES
Yady Rogers 1990 has been reminded of the following prior to an upcoming fibroscan exam:    Do not eat anything for at least 3 hours prior to your examination.  Wear comfortable clothes that will allow access to the right side of your rib cage.  You will need to be able to stand and get up on the exam table.  You will need to be able to lay flat, place your right hand behind their head, cross your right leg over their left leg and bend slightly left into a curved position.          The fibroscan should take approximately 5 to 10 minutes.    [facility] is located at [address].    Patient verbalized understanding for all of the above information.

## 2025-07-17 LAB
A1AT PHENOTYP SERPL IFE: NORMAL
A1AT SERPL-MCNC: 143 MG/DL (ref 100–188)

## 2025-07-18 ENCOUNTER — PROCEDURE VISIT (OUTPATIENT)
Dept: OTHER | Facility: HOSPITAL | Age: 35
End: 2025-07-18
Payer: COMMERCIAL

## 2025-07-18 DIAGNOSIS — K76.0 FATTY LIVER: Primary | ICD-10-CM

## 2025-07-18 PROCEDURE — 91200 LIVER ELASTOGRAPHY: CPT | Performed by: NURSE PRACTITIONER

## 2025-07-22 NOTE — PROGRESS NOTES
Liver Elastography    Performed by: Phyllis Leroy APRN  Authorized by: Phyllis Leroy APRN  Ordering Provider: Phyllis Leroy APRN    Probe:  M+  Procedure Details:  Procedure: After providing an oral and written explanation of the Fibroscan vibration controlled transient elastography (VTCE) test procedure to the patient. The patient was placed in supine position with right arm in maximum abduction to allow optimal exposure of right lateral abdomen. Patient was briefly assessed, identifying terminus of the xyphoid process and locating an ideal transient elastography testing site, midline and lateral to this point. Patient was instructed to breathe normally and remain stationary during the test process. Pre-measurement data confirmed the transient elastography probe was centered over the liver parenchyma. A series of ten 50 Hz mechanical pulses were applied with controlled application pressure to induce a mechanical shear wave in the liver tissue. For each measurement, the shear wave propagation speed was detected, displayed and converted to its equivalent liver stiffness value in kilopascals. Skin to liver capsules distance and shear wave characteristics were monitored during the entire examination to assure quality data. Median liver stiffness measurement and interquartile range were calculated and displayed in real time. Acquired measurement data was stored and submitted to the provider for review and interpretation. Patient tolerated the procedure well and was discharged without incident.   Clinical Information:     NPO 3 Hours or More: Yes      Actively Drinking: No    Findings:     Median Liver Stiffness Score:  5.4    Interquartile Range (IQR) to Median Ratio:  8    Olivia Stiffness Consistent with:  F0-F1    Current Scan Considered Reliab: Yes      Median Controlled Attenuation Parameter (dB/m):  169    IQR:  8    CAP SCORE:  Normal/mild liver fat

## 2025-07-25 ENCOUNTER — ANESTHESIA EVENT (OUTPATIENT)
Dept: GASTROENTEROLOGY | Facility: HOSPITAL | Age: 35
End: 2025-07-25
Payer: COMMERCIAL

## 2025-07-25 NOTE — ANESTHESIA PREPROCEDURE EVALUATION
Anesthesia Evaluation     Patient summary reviewed and Nursing notes reviewed   no history of anesthetic complications:   NPO Solid Status: > 8 hours  NPO Liquid Status: > 2 hours           Airway   Mallampati: I  TM distance: >3 FB  Neck ROM: full  No difficulty expected  Dental - normal exam     Pulmonary - negative pulmonary ROS and normal exam    breath sounds clear to auscultation  Cardiovascular - normal exam  Exercise tolerance: good (4-7 METS)    ECG reviewed  Rhythm: regular  Rate: normal    (+) dysrhythmias (PSVT) Paroxysmal Atrial Fib      Neuro/Psych- negative ROS  GI/Hepatic/Renal/Endo - negative ROS     ROS Comment: Abdominal pain, constipation    Musculoskeletal (-) negative ROS    Abdominal  - normal exam   Substance History - negative use     OB/GYN negative ob/gyn ROS         Other - negative ROS       ROS/Med Hx Other: PAT Nursing Notes unavailable.     Urine pregnancy negative 7/28/25    Echo 11/17/2023:  ·  Left ventricular systolic function is normal. Calculated left ventricular EF = 65.2%  ·  Left ventricular diastolic function was normal.    EKG 10/30/2023:  HR 59 bpm  NSR    Holter Monitor 11/17/2023:  Patient was monitored for 2 days.  Maximum heart rate is 129.  Minimum heart rate of 43.  Average heart rate 64.  There are occasional PACs and PVCs.                Anesthesia Plan    ASA 2     general   total IV anesthesia  (Risks explained including allergic reactions, BP, HR, O2 changes, aspiration, apnea, advanced airway placement. Pt verbalized understanding.  Total IV Anesthesia    Patient understands anesthesia not responsible for dental damage.  )  intravenous induction     Anesthetic plan, risks, benefits, and alternatives have been provided, discussed and informed consent has been obtained with: patient.  Pre-procedure education provided  Plan discussed with CRNA.      CODE STATUS:

## 2025-07-28 ENCOUNTER — HOSPITAL ENCOUNTER (OUTPATIENT)
Facility: HOSPITAL | Age: 35
Setting detail: HOSPITAL OUTPATIENT SURGERY
Discharge: HOME | End: 2025-07-28
Attending: INTERNAL MEDICINE | Admitting: INTERNAL MEDICINE
Payer: COMMERCIAL

## 2025-07-28 ENCOUNTER — ANESTHESIA (OUTPATIENT)
Dept: GASTROENTEROLOGY | Facility: HOSPITAL | Age: 35
End: 2025-07-28
Payer: COMMERCIAL

## 2025-07-28 ENCOUNTER — TELEPHONE (OUTPATIENT)
Dept: GASTROENTEROLOGY | Facility: CLINIC | Age: 35
End: 2025-07-28
Payer: COMMERCIAL

## 2025-07-28 VITALS
SYSTOLIC BLOOD PRESSURE: 102 MMHG | DIASTOLIC BLOOD PRESSURE: 69 MMHG | TEMPERATURE: 98 F | OXYGEN SATURATION: 99 % | RESPIRATION RATE: 18 BRPM | HEART RATE: 64 BPM | WEIGHT: 156.75 LBS | BODY MASS INDEX: 23.84 KG/M2

## 2025-07-28 DIAGNOSIS — R14.0 ABDOMINAL BLOATING: ICD-10-CM

## 2025-07-28 DIAGNOSIS — R10.84 GENERALIZED ABDOMINAL PAIN: ICD-10-CM

## 2025-07-28 DIAGNOSIS — K59.04 CHRONIC IDIOPATHIC CONSTIPATION: ICD-10-CM

## 2025-07-28 LAB — B-HCG UR QL: NEGATIVE

## 2025-07-28 PROCEDURE — 81025 URINE PREGNANCY TEST: CPT

## 2025-07-28 PROCEDURE — 88305 TISSUE EXAM BY PATHOLOGIST: CPT | Performed by: INTERNAL MEDICINE

## 2025-07-28 PROCEDURE — 25010000002 LIDOCAINE PF 2% 2 % SOLUTION: Performed by: NURSE ANESTHETIST, CERTIFIED REGISTERED

## 2025-07-28 PROCEDURE — 45378 DIAGNOSTIC COLONOSCOPY: CPT | Performed by: INTERNAL MEDICINE

## 2025-07-28 PROCEDURE — 25010000002 PROPOFOL 10 MG/ML EMULSION: Performed by: NURSE ANESTHETIST, CERTIFIED REGISTERED

## 2025-07-28 PROCEDURE — 25810000003 LACTATED RINGERS PER 1000 ML: Performed by: NURSE ANESTHETIST, CERTIFIED REGISTERED

## 2025-07-28 PROCEDURE — 43239 EGD BIOPSY SINGLE/MULTIPLE: CPT | Performed by: INTERNAL MEDICINE

## 2025-07-28 RX ORDER — BUPIVACAINE HCL/0.9 % NACL/PF 0.125 %
PLASTIC BAG, INJECTION (ML) EPIDURAL AS NEEDED
Status: DISCONTINUED | OUTPATIENT
Start: 2025-07-28 | End: 2025-07-28 | Stop reason: SURG

## 2025-07-28 RX ORDER — LIDOCAINE HYDROCHLORIDE 20 MG/ML
INJECTION, SOLUTION EPIDURAL; INFILTRATION; INTRACAUDAL; PERINEURAL AS NEEDED
Status: DISCONTINUED | OUTPATIENT
Start: 2025-07-28 | End: 2025-07-28 | Stop reason: SURG

## 2025-07-28 RX ORDER — SODIUM CHLORIDE, SODIUM LACTATE, POTASSIUM CHLORIDE, CALCIUM CHLORIDE 600; 310; 30; 20 MG/100ML; MG/100ML; MG/100ML; MG/100ML
30 INJECTION, SOLUTION INTRAVENOUS CONTINUOUS
Status: DISCONTINUED | OUTPATIENT
Start: 2025-07-28 | End: 2025-07-28 | Stop reason: HOSPADM

## 2025-07-28 RX ORDER — PROPOFOL 10 MG/ML
VIAL (ML) INTRAVENOUS AS NEEDED
Status: DISCONTINUED | OUTPATIENT
Start: 2025-07-28 | End: 2025-07-28 | Stop reason: SURG

## 2025-07-28 RX ADMIN — Medication 50 MCG: at 11:44

## 2025-07-28 RX ADMIN — SODIUM CHLORIDE, POTASSIUM CHLORIDE, SODIUM LACTATE AND CALCIUM CHLORIDE: 600; 310; 30; 20 INJECTION, SOLUTION INTRAVENOUS at 11:22

## 2025-07-28 RX ADMIN — PROPOFOL 100 MG: 10 INJECTION, EMULSION INTRAVENOUS at 11:24

## 2025-07-28 RX ADMIN — LIDOCAINE HYDROCHLORIDE 50 MG: 20 INJECTION, SOLUTION EPIDURAL; INFILTRATION; INTRACAUDAL; PERINEURAL at 11:24

## 2025-07-28 RX ADMIN — Medication 100 MCG: at 11:35

## 2025-07-28 RX ADMIN — PROPOFOL 200 MCG/KG/MIN: 10 INJECTION, EMULSION INTRAVENOUS at 11:24

## 2025-07-28 NOTE — ANESTHESIA POSTPROCEDURE EVALUATION
Patient: Yady Rogers    Procedure Summary       Date: 07/28/25 Room / Location: [facility] ENDOSCOPY 1 / [facility] ENDOSCOPY    Anesthesia Start: 1122 Anesthesia Stop: 1202    Procedures:       ESOPHAGOGASTRODUODENOSCOPY      COLONOSCOPY Diagnosis:       Generalized abdominal pain      Abdominal bloating      Chronic idiopathic constipation      (Generalized abdominal pain [R10.84])      (Abdominal bloating [R14.0])      (Chronic idiopathic constipation [K59.04])    Surgeons: Tana Max MD Provider: Cachorro Díaz CRNA    Anesthesia Type: general ASA Status: 2            Anesthesia Type: general    Vitals  Vitals Value Taken Time   /69 07/28/25 12:15   Temp 36.7 °C (98 °F) 07/28/25 12:00   Pulse 64 07/28/25 12:15   Resp 18 07/28/25 12:15   SpO2 99 % 07/28/25 12:15           Post Anesthesia Care and Evaluation    Patient location during evaluation: bedside  Patient participation: complete - patient participated  Level of consciousness: awake  Pain score: 0  Pain management: adequate    Airway patency: patent  Anesthetic complications: No anesthetic complications  PONV Status: controlled  Cardiovascular status: acceptable and stable  Respiratory status: acceptable  Hydration status: acceptable

## 2025-07-28 NOTE — H&P
Pre Procedure History & Physical    Chief Complaint:   Iron deficiency, generalized abd pain, bloating, constipation    Subjective     HPI:   36 yo F here for eval of iron deficiency, generalized abd pain, bloating, constipation.    Past Medical History:   Past Medical History:   Diagnosis Date    SVT (supraventricular tachycardia)        Past Surgical History:  Past Surgical History:   Procedure Laterality Date    BREAST SURGERY      Fibroadenoma    TONSILLECTOMY      UPPER GASTROINTESTINAL ENDOSCOPY         Family History:  Family History   Problem Relation Age of Onset    Cervical cancer Mother        Social History:   reports that she has never smoked. She has never been exposed to tobacco smoke. She has never used smokeless tobacco. She reports that she does not drink alcohol and does not use drugs.    Medications:   Medications Prior to Admission   Medication Sig Dispense Refill Last Dose/Taking    famotidine (PEPCID) 10 MG tablet Take 1 tablet by mouth Daily.       fluconazole (Diflucan) 150 MG tablet Take 1 tablet by mouth Every 72 (Seventy-Two) Hours. (Patient not taking: Reported on 6/2/2025) 2 tablet 0     PEG 3350-KCl-NaCl-NaSulf-MgSul (Suflave) 178.7 g reconstituted solution Take 178.7 g by mouth Take As Directed. 1 each 0     valACYclovir (VALTREX) 500 MG tablet Take 1 tablet by mouth Daily. (Patient taking differently: Take 1 tablet by mouth Daily. PRN) 30 tablet 2        Allergies:  Fluarix [influenza virus vaccine]    ROS:    Pertinent items are noted in HPI     Objective     Weight 71.1 kg (156 lb 12 oz).    Physical Exam   Constitutional: Pt is oriented to person, place, and time and well-developed, well-nourished, and in no distress.   Mouth/Throat: Oropharynx is clear and moist.   Neck: Normal range of motion.   Cardiovascular: Normal rate, regular rhythm and normal heart sounds.    Pulmonary/Chest: Effort normal and breath sounds normal.   Abdominal: Soft. Nontender  Skin: Skin is warm and dry.    Psychiatric: Mood, memory, affect and judgment normal.     Assessment & Plan     Diagnosis:  Iron deficiency, generalized abd pain, bloating, constipation    Anticipated Surgical Procedure:  EGD/colonoscopy    The risks, benefits, and alternatives of this procedure have been discussed with the patient or the responsible party- the patient understands and agrees to proceed.

## 2025-07-29 ENCOUNTER — RESULTS FOLLOW-UP (OUTPATIENT)
Dept: GASTROENTEROLOGY | Facility: HOSPITAL | Age: 35
End: 2025-07-29
Payer: COMMERCIAL

## 2025-07-29 LAB
CYTO UR: NORMAL
LAB AP CASE REPORT: NORMAL
LAB AP CLINICAL INFORMATION: NORMAL
PATH REPORT.FINAL DX SPEC: NORMAL
PATH REPORT.GROSS SPEC: NORMAL

## 2025-07-29 NOTE — TELEPHONE ENCOUNTER
Left voicemail for pt requesting a returned call      Placed age 47 recall, updated care gap updated.

## 2025-07-30 NOTE — TELEPHONE ENCOUNTER
Result Note  EGD showed mild gastritis.  Otherwise negative.  Recall colonoscopy again at age 47.  Please send letter to patient and PCP.  Patient to keep follow-up as planned.    Attempted to contact pt. LVM requesting a return call.   Letter mailed to pt and pcp.

## 2025-08-01 NOTE — TELEPHONE ENCOUNTER
Result Note  EGD showed mild gastritis.  Otherwise negative.  Recall colonoscopy again at age 47.  Please send letter to patient and PCP.  Patient to keep follow-up as planned.       Letter mailed to pt after failed attempts to contact via phone.

## (undated) DEVICE — DEFENDO AIR WATER SUCTION AND BIOPSY VALVE KIT FOR  OLYMPUS: Brand: DEFENDO AIR/WATER/SUCTION AND BIOPSY VALVE

## (undated) DEVICE — SOL IRRG H2O PL/BG 1000ML STRL

## (undated) DEVICE — SOLIDIFIER LIQLOC PLS 1500CC BT

## (undated) DEVICE — Device

## (undated) DEVICE — THE STERILE LIGHT HANDLE COVER IS USED WITH STERIS SURGICAL LIGHTING AND VISUALIZATION SYSTEMS.

## (undated) DEVICE — CONN JET HYDRA H20 AUXILIARY DISP

## (undated) DEVICE — BLCK/BITE BLOX WO/DENTL/RIM W/STRAP 54F

## (undated) DEVICE — LINER SURG CANSTR SXN S/RIGD 1500CC